# Patient Record
Sex: FEMALE | Race: WHITE | NOT HISPANIC OR LATINO | Employment: FULL TIME | URBAN - NONMETROPOLITAN AREA
[De-identification: names, ages, dates, MRNs, and addresses within clinical notes are randomized per-mention and may not be internally consistent; named-entity substitution may affect disease eponyms.]

---

## 2020-09-18 ENCOUNTER — HOSPITAL ENCOUNTER (OUTPATIENT)
Dept: ULTRASOUND IMAGING | Facility: HOSPITAL | Age: 55
Discharge: HOME | End: 2020-09-18

## 2020-09-18 ENCOUNTER — TRANSCRIBE ORDERS (OUTPATIENT)
Dept: ADMINISTRATIVE | Facility: HOSPITAL | Age: 55
End: 2020-09-18

## 2020-09-18 DIAGNOSIS — R79.89 ELEVATED SERUM CREATININE: Primary | ICD-10-CM

## 2020-09-21 ENCOUNTER — HOSPITAL ENCOUNTER (OUTPATIENT)
Dept: ULTRASOUND IMAGING | Facility: HOSPITAL | Age: 55
Discharge: HOME | End: 2020-09-21
Admitting: NURSE PRACTITIONER

## 2020-09-21 ENCOUNTER — TRANSCRIBE ORDERS (OUTPATIENT)
Dept: ADMINISTRATIVE | Facility: HOSPITAL | Age: 55
End: 2020-09-21

## 2020-09-21 DIAGNOSIS — R79.89 ELEVATED SERUM CREATININE: Primary | ICD-10-CM

## 2020-09-21 PROCEDURE — 76775 US EXAM ABDO BACK WALL LIM: CPT

## 2021-01-20 DIAGNOSIS — D69.6 THROMBOCYTOPENIA (HCC): Primary | ICD-10-CM

## 2021-01-21 ENCOUNTER — OFFICE VISIT (OUTPATIENT)
Dept: HEMATOLOGY | Age: 56
End: 2021-01-21
Payer: COMMERCIAL

## 2021-01-21 ENCOUNTER — HOSPITAL ENCOUNTER (OUTPATIENT)
Dept: INFUSION THERAPY | Age: 56
Discharge: HOME | End: 2021-01-21
Payer: COMMERCIAL

## 2021-01-21 VITALS
DIASTOLIC BLOOD PRESSURE: 83 MMHG | SYSTOLIC BLOOD PRESSURE: 157 MMHG | OXYGEN SATURATION: 97 % | HEIGHT: 63 IN | BODY MASS INDEX: 30.69 KG/M2 | HEART RATE: 73 BPM | TEMPERATURE: 97.3 F | WEIGHT: 173.2 LBS

## 2021-01-21 DIAGNOSIS — D75.89 MACROCYTOSIS: ICD-10-CM

## 2021-01-21 DIAGNOSIS — D69.6 THROMBOCYTOPENIA (HCC): ICD-10-CM

## 2021-01-21 DIAGNOSIS — Z86.2 HISTORY OF ANEMIA: ICD-10-CM

## 2021-01-21 DIAGNOSIS — D69.6 THROMBOCYTOPENIA (HCC): Primary | ICD-10-CM

## 2021-01-21 DIAGNOSIS — N18.9 CHRONIC KIDNEY DISEASE, UNSPECIFIED CKD STAGE: ICD-10-CM

## 2021-01-21 DIAGNOSIS — F10.10 ALCOHOL ABUSE: ICD-10-CM

## 2021-01-21 LAB
BASOPHILS ABSOLUTE: 0.03 K/UL (ref 0.01–0.08)
BASOPHILS RELATIVE PERCENT: 0.3 % (ref 0.1–1.2)
EOSINOPHILS ABSOLUTE: 0.14 K/UL (ref 0.04–0.54)
EOSINOPHILS RELATIVE PERCENT: 1.4 % (ref 0.7–7)
FOLATE: 5.8 NG/ML (ref 2.7–20)
HCT VFR BLD CALC: 38.6 % (ref 34.1–44.9)
HEMOGLOBIN: 13 G/DL (ref 11.2–15.7)
LACTATE DEHYDROGENASE: 479 U/L (ref 313–618)
LYMPHOCYTES ABSOLUTE: 2.07 K/UL (ref 1.18–3.74)
LYMPHOCYTES RELATIVE PERCENT: 20.5 % (ref 19.3–53.1)
MCH RBC QN AUTO: 33.8 PG (ref 25.6–32.2)
MCHC RBC AUTO-ENTMCNC: 33.7 G/DL (ref 32.3–35.5)
MCV RBC AUTO: 100.3 FL (ref 79.4–94.8)
MONOCYTES ABSOLUTE: 0.55 K/UL (ref 0.24–0.82)
MONOCYTES RELATIVE PERCENT: 5.5 % (ref 4.7–12.5)
NEUTROPHILS ABSOLUTE: 7.29 K/UL (ref 1.56–6.13)
NEUTROPHILS RELATIVE PERCENT: 72.3 % (ref 34–71.1)
PDW BLD-RTO: 13.7 % (ref 11.7–14.4)
PLATELET # BLD: 210 K/UL (ref 182–369)
PMV BLD AUTO: 9.6 FL (ref 7.4–10.4)
RBC # BLD: 3.85 M/UL (ref 3.93–5.22)
VITAMIN B-12: 803 PG/ML (ref 239–931)
WBC # BLD: 10.08 K/UL (ref 3.98–10.04)

## 2021-01-21 PROCEDURE — 99203 OFFICE O/P NEW LOW 30 MIN: CPT | Performed by: NURSE PRACTITIONER

## 2021-01-21 PROCEDURE — 85025 COMPLETE CBC W/AUTO DIFF WBC: CPT

## 2021-01-21 PROCEDURE — 82746 ASSAY OF FOLIC ACID SERUM: CPT

## 2021-01-21 PROCEDURE — 83615 LACTATE (LD) (LDH) ENZYME: CPT

## 2021-01-21 PROCEDURE — 82607 VITAMIN B-12: CPT

## 2021-01-21 PROCEDURE — 99202 OFFICE O/P NEW SF 15 MIN: CPT

## 2021-01-21 ASSESSMENT — ENCOUNTER SYMPTOMS
NAUSEA: 0
COUGH: 0
VOMITING: 0
SORE THROAT: 0
WHEEZING: 0
SHORTNESS OF BREATH: 0
ABDOMINAL PAIN: 0
TROUBLE SWALLOWING: 0
DIARRHEA: 0
EYE ITCHING: 0
CONSTIPATION: 0
EYE DISCHARGE: 0

## 2021-01-21 NOTE — PROGRESS NOTES
Carrol Spangler reports a history of LLE DVT/bilateral PE dating to 2010. She states she was treated at [facility] and treated with a short course of warfarin. She states testing for predisposing factors was negative. Additional specific details are unknown. We will try to obtain records. There is currently no serology or radiographic imaging in epic. Past Medical History:   Diagnosis Date    Alcohol abuse     Cataract     L eye    DVT (deep venous thrombosis) (HCC)     LLE    Hypercholesterolemia     Pulmonary emboli (HCC)     Thyroid cyst     Type II diabetes mellitus (HCC)      Past Surgical History:   Procedure Laterality Date    CHOLECYSTECTOMY, LAPAROSCOPIC         Current Outpatient Medications:     amLODIPine-benazepril (LOTREL) 10-40 MG per capsule, Take 1 capsule by mouth daily, Disp: , Rfl:     nebivolol (BYSTOLIC) 10 MG tablet, Take by mouth daily, Disp: , Rfl:     Dulaglutide (TRULICITY) 1.5 SW/4.9ZJ SOPN, Inject 1.5 mg into the skin once a week, Disp: , Rfl:     FLUoxetine (PROZAC) 20 MG capsule, Take 20 mg by mouth 2 times daily 2 capsules in morning and 2 capsules at night, Disp: , Rfl:     diphenhydrAMINE (BENADRYL) 25 MG capsule, Take 25 mg by mouth every 6 hours as needed for Itching, Disp: , Rfl:     b complex vitamins capsule, Take 1 capsule by mouth daily, Disp: , Rfl:     cyclobenzaprine (FLEXERIL) 10 MG tablet, Take 10 mg by mouth 3 times daily, Disp: , Rfl:     diclofenac sodium (VOLTAREN) 1 % GEL, Apply 4 g topically 3 times daily as needed for Pain, Disp: , Rfl:     Omega-3 1000 MG CAPS, Take by mouth 2 times daily, Disp: , Rfl:     fenofibrate (TRICOR) 145 MG tablet, Take 145 mg by mouth daily, Disp: , Rfl:     rosuvastatin (CRESTOR) 10 MG tablet, Take 10 mg by mouth daily, Disp: , Rfl:     mupirocin (BACTROBAN) 2 % ointment, Apply topically 3 times daily Apply topically 3 times daily. , Disp: , Rfl:   omeprazole (PRILOSEC) 40 MG delayed release capsule, Take 40 mg by mouth daily, Disp: , Rfl:     HYDROcodone-acetaminophen (NORCO) 7.5-325 MG per tablet, Take 1 tablet by mouth every 6 hours as needed for Pain., Disp: , Rfl:    Allergies: Allergies   Allergen Reactions    Penicillins Rash     Pt gets welts when taken     Social History     Tobacco Use    Smoking status: Former Smoker     Packs/day: 2.00     Years: 44.00     Pack years: 88.00     Start date:      Quit date:      Years since quittin.0    Smokeless tobacco: Never Used   Substance Use Topics    Alcohol use: Yes     Comment: 2-3 glasses of Jack/Coke per day    Drug use: Not on file     Family History   Problem Relation Age of Onset    Cancer Mother 80        Lung    Cancer Father 79        renal      Health Maintenance   Topic Date Due    Potassium monitoring  1965    Creatinine monitoring  1965    Hepatitis C screen  1965    Lipid screen  10/02/1973    HIV screen  10/02/1978    DTaP/Tdap/Td vaccine (1 - Tdap) 10/02/1982    Cervical cancer screen  10/02/1984    Diabetes screen  10/02/2003    Breast cancer screen  10/02/2013    Shingles Vaccine (1 of 2) 10/02/2013    Colon cancer screen colonoscopy  10/02/2013    Flu vaccine (1) 2020    Hepatitis A vaccine  Aged Out    Hepatitis B vaccine  Aged Out    Hib vaccine  Aged Out    Meningococcal (ACWY) vaccine  Aged Out    Pneumococcal 0-64 years Vaccine  Aged Out     Subjective   Review of Systems   Constitutional: Positive for fatigue. Negative for fever. HENT: Negative for dental problem, hearing loss, mouth sores, nosebleeds, sore throat and trouble swallowing. Eyes: Negative for discharge and itching. Respiratory: Negative for cough, shortness of breath and wheezing. Cardiovascular: Negative for chest pain, palpitations and leg swelling. Gastrointestinal: Negative for abdominal pain, constipation, diarrhea, nausea and vomiting. Endocrine: Negative for cold intolerance and heat intolerance. Genitourinary: Negative for dysuria, frequency, hematuria and urgency. Musculoskeletal: Negative for arthralgias, joint swelling and myalgias. Skin: Negative for pallor and rash. Allergic/Immunologic: Negative for environmental allergies and immunocompromised state. Neurological: Negative for seizures, syncope and numbness. Hematological: Negative for adenopathy. Does not bruise/bleed easily. Psychiatric/Behavioral: Negative for agitation, behavioral problems and confusion. Objective      Physical Exam  Vitals signs reviewed. Constitutional:       General: She is not in acute distress. Appearance: She is well-developed. She is not toxic-appearing or diaphoretic. HENT:      Head: Normocephalic and atraumatic. Right Ear: External ear normal.      Left Ear: External ear normal.      Nose: Nose normal.      Mouth/Throat:      Mouth: Mucous membranes are moist.   Eyes:      General: No scleral icterus. Right eye: No discharge. Left eye: No discharge. Conjunctiva/sclera: Conjunctivae normal.   Neck:      Musculoskeletal: Neck supple. Trachea: No tracheal deviation. Cardiovascular:      Rate and Rhythm: Normal rate and regular rhythm. Pulmonary:      Effort: Pulmonary effort is normal. No respiratory distress. Breath sounds: Normal breath sounds. No wheezing or rales. Abdominal:      General: Bowel sounds are normal. There is no distension. Palpations: Abdomen is soft. Tenderness: There is no abdominal tenderness. There is no guarding. Genitourinary:     Comments: Exam deferred  Musculoskeletal:         General: No tenderness or deformity. Comments: Normal ROM all four extremities   Lymphadenopathy:      Cervical: No cervical adenopathy. Right cervical: No superficial or deep cervical adenopathy. Left cervical: No superficial or deep cervical adenopathy. Upper Body:      Right upper body: No supraclavicular or axillary adenopathy. Left upper body: No supraclavicular or axillary adenopathy. Comments:      Skin:     General: Skin is warm and dry. Findings: No rash. Neurological:      Mental Status: She is alert and oriented to person, place, and time. Comments: follows commands, non-focal   Psychiatric:         Behavior: Behavior normal. Behavior is cooperative. Thought Content: Thought content normal.         Judgment: Judgment normal.      Comments: Alert and oriented to person, place and time. BP (!) 157/83   Pulse 73   Temp 97.3 °F (36.3 °C) (Temporal)   Ht 5' 3\" (1.6 m)   Wt 173 lb 3.2 oz (78.6 kg)   SpO2 97%   BMI 30.68 kg/m²   Wt Readings from Last 3 Encounters:   01/21/21 173 lb 3.2 oz (78.6 kg)     Labs reviewed by me:  CBC 01/21/21: WBC 10.08, Hgb 13.0/.3, platelets 438,701    ASSESSMENT/PLAN:    Kvng Dodd was seen today for new patient. Diagnoses and all orders for this visit:    Thrombocytopenia ([facility] [address]  Platelets 549,269 on 9/17/2020  Platelets 005,476 today, 1/21/2021  -     Lactate Dehydrogenase; Future  -     Vitamin B12; Future  -     Folate; Future  -     Copper, Serum; Future  -     Haptoglobin; Future  -     Reticulocytes; Future    Transient marginal platelet count differential includes alcohol use, possible splenomegaly, vitamin deficiency versus others    Macrocytosis  Hgb 13. .30,   -     Vitamin B12; Future  -     Folate; Future  -     Copper, Serum; Future  -     Haptoglobin; Future  -     Reticulocytes;  Future    History of anemia   Hgb 10.4 in September,2020  Hgb currently 13.0 today, 1/21/2021  could be multifactorial including history of iron deficiency, CKD associated, EtOH, etc.    Chronic kidney disease, unspecified CKD stage

## 2021-01-25 ENCOUNTER — TELEPHONE (OUTPATIENT)
Dept: HEMATOLOGY | Age: 56
End: 2021-01-25

## 2022-10-19 ENCOUNTER — APPOINTMENT (OUTPATIENT)
Dept: CT IMAGING | Age: 57
End: 2022-10-19
Payer: COMMERCIAL

## 2022-10-19 ENCOUNTER — HOSPITAL ENCOUNTER (EMERGENCY)
Age: 57
Discharge: HOME | End: 2022-10-20
Attending: EMERGENCY MEDICINE
Payer: COMMERCIAL

## 2022-10-19 ENCOUNTER — APPOINTMENT (OUTPATIENT)
Dept: GENERAL RADIOLOGY | Age: 57
End: 2022-10-19
Payer: COMMERCIAL

## 2022-10-19 DIAGNOSIS — E16.2 HYPOGLYCEMIA: ICD-10-CM
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
## 2022-10-19 DIAGNOSIS — R55 SYNCOPE, UNSPECIFIED SYNCOPE TYPE: Primary | ICD-10-CM

## 2022-10-19 DIAGNOSIS — S00.83XA TRAUMATIC HEMATOMA OF FOREHEAD, INITIAL ENCOUNTER: ICD-10-CM

## 2022-10-19 DIAGNOSIS — S09.90XA CLOSED HEAD INJURY, INITIAL ENCOUNTER: ICD-10-CM

## 2022-10-19 LAB
ALBUMIN SERPL-MCNC: 4 G/DL (ref 3.5–5.2)
ALP BLD-CCNC: 292 U/L (ref 35–104)
ALT SERPL-CCNC: 36 U/L (ref 5–33)
ANION GAP SERPL CALCULATED.3IONS-SCNC: 16 MMOL/L (ref 7–19)
AST SERPL-CCNC: 47 U/L (ref 5–32)
BASOPHILS ABSOLUTE: 0 K/UL (ref 0–0.2)
BASOPHILS RELATIVE PERCENT: 0.1 % (ref 0–1)
BILIRUB SERPL-MCNC: 0.3 MG/DL (ref 0.2–1.2)
BUN BLDV-MCNC: 22 MG/DL (ref 6–20)
CALCIUM SERPL-MCNC: 9.4 MG/DL (ref 8.6–10)
CHLORIDE BLD-SCNC: 105 MMOL/L (ref 98–111)
CO2: 15 MMOL/L (ref 22–29)
CREAT SERPL-MCNC: 2.1 MG/DL (ref 0.5–0.9)
EOSINOPHILS ABSOLUTE: 0 K/UL (ref 0–0.6)
EOSINOPHILS RELATIVE PERCENT: 0.1 % (ref 0–5)
GFR SERPL CREATININE-BSD FRML MDRD: 27 ML/MIN/{1.73_M2}
GLUCOSE BLD-MCNC: 70 MG/DL (ref 70–99)
GLUCOSE BLD-MCNC: 71 MG/DL (ref 74–109)
GLUCOSE BLD-MCNC: 93 MG/DL (ref 70–99)
HCT VFR BLD CALC: 39.7 % (ref 37–47)
HEMOGLOBIN: 12.8 G/DL (ref 12–16)
IMMATURE GRANULOCYTES #: 0 K/UL
LYMPHOCYTES ABSOLUTE: 0.7 K/UL (ref 1.1–4.5)
LYMPHOCYTES RELATIVE PERCENT: 8.1 % (ref 20–40)
MCH RBC QN AUTO: 30.7 PG (ref 27–31)
MCHC RBC AUTO-ENTMCNC: 32.2 G/DL (ref 33–37)
MCV RBC AUTO: 95.2 FL (ref 81–99)
MONOCYTES ABSOLUTE: 0.4 K/UL (ref 0–0.9)
MONOCYTES RELATIVE PERCENT: 4.4 % (ref 0–10)
NEUTROPHILS ABSOLUTE: 7 K/UL (ref 1.5–7.5)
NEUTROPHILS RELATIVE PERCENT: 87.1 % (ref 50–65)
PDW BLD-RTO: 14 % (ref 11.5–14.5)
PERFORMED ON: NORMAL
PERFORMED ON: NORMAL
PLATELET # BLD: 140 K/UL (ref 130–400)
PMV BLD AUTO: 10 FL (ref 9.4–12.3)
POTASSIUM SERPL-SCNC: 3.7 MMOL/L (ref 3.5–5)
RBC # BLD: 4.17 M/UL (ref 4.2–5.4)
SODIUM BLD-SCNC: 136 MMOL/L (ref 136–145)
TOTAL PROTEIN: 7.3 G/DL (ref 6.6–8.7)
WBC # BLD: 8 K/UL (ref 4.8–10.8)

## 2022-10-19 PROCEDURE — 99285 EMERGENCY DEPT VISIT HI MDM: CPT

## 2022-10-19 PROCEDURE — 93005 ELECTROCARDIOGRAM TRACING: CPT | Performed by: EMERGENCY MEDICINE

## 2022-10-19 PROCEDURE — 82947 ASSAY GLUCOSE BLOOD QUANT: CPT

## 2022-10-19 PROCEDURE — 72125 CT NECK SPINE W/O DYE: CPT

## 2022-10-19 PROCEDURE — 72125 CT NECK SPINE W/O DYE: CPT | Performed by: RADIOLOGY

## 2022-10-19 PROCEDURE — 36415 COLL VENOUS BLD VENIPUNCTURE: CPT

## 2022-10-19 PROCEDURE — 70450 CT HEAD/BRAIN W/O DYE: CPT | Performed by: RADIOLOGY

## 2022-10-19 PROCEDURE — 70450 CT HEAD/BRAIN W/O DYE: CPT

## 2022-10-19 PROCEDURE — 80053 COMPREHEN METABOLIC PANEL: CPT

## 2022-10-19 PROCEDURE — 85025 COMPLETE CBC W/AUTO DIFF WBC: CPT

## 2022-10-19 PROCEDURE — 71045 X-RAY EXAM CHEST 1 VIEW: CPT

## 2022-10-19 PROCEDURE — 71045 X-RAY EXAM CHEST 1 VIEW: CPT | Performed by: RADIOLOGY

## 2022-10-19 ASSESSMENT — ENCOUNTER SYMPTOMS
RHINORRHEA: 0
VOICE CHANGE: 0
EYE PAIN: 0
DIARRHEA: 0
EYE REDNESS: 0
VOMITING: 0
COUGH: 0
ABDOMINAL PAIN: 0
SHORTNESS OF BREATH: 0

## 2022-10-19 ASSESSMENT — PAIN - FUNCTIONAL ASSESSMENT: PAIN_FUNCTIONAL_ASSESSMENT: 0-10

## 2022-10-19 ASSESSMENT — PAIN SCALES - GENERAL: PAINLEVEL_OUTOF10: 0

## 2022-10-20 VITALS
SYSTOLIC BLOOD PRESSURE: 142 MMHG | TEMPERATURE: 98.4 F | OXYGEN SATURATION: 95 % | HEART RATE: 86 BPM | DIASTOLIC BLOOD PRESSURE: 73 MMHG | HEIGHT: 63 IN | RESPIRATION RATE: 13 BRPM | BODY MASS INDEX: 33.66 KG/M2 | WEIGHT: 190 LBS

## 2022-10-20 LAB
EKG P AXIS: 46 DEGREES
EKG P-R INTERVAL: 186 MS
EKG Q-T INTERVAL: 376 MS
EKG QRS DURATION: 102 MS
EKG QTC CALCULATION (BAZETT): 407 MS
EKG T AXIS: 52 DEGREES

## 2022-10-20 NOTE — ED NOTES
Discharge teaching provided, pt verbalized understanding and waiting in her room for a ride.       yKlie Hernández RN  10/20/22 0001

## 2022-10-20 NOTE — ED NOTES
PT assisted to POV via w/c with sister driving without incident.       Jamir Carmen RN  10/20/22 0005

## 2022-10-20 NOTE — ED PROVIDER NOTES
[facility] EMERGENCY DEPT  EMERGENCY DEPARTMENT ENCOUNTER      Pt Name: Isauro Faria  MRN: [id number]  Calliegfpallavi 1965  Date of evaluation: 10/19/2022  Provider: Belia Olson MD    CHIEF COMPLAINT       Chief Complaint   Patient presents with    Loss of Consciousness     \"Felt weird\", fell, hit her head and passed out. Cap glucose 75, oral glucose given by first responders         HISTORY OF PRESENT ILLNESS   (Location/Symptom, Timing/Onset,Context/Setting, Quality, Duration, Modifying Factors, Severity)  Note limiting factors. Isauro Faria is a 59 y.o. female who presents to the emergency department for evaluation after an episode of loss of consciousness tonight. Patient says that she went to work this evening and after she got there started feeling lightheaded and dizzy. Says she went to take a break and get a soda because her blood sugar was dropping. Says that she got a soda and sat on a stool and felt like there were lights across her vision in both eyes that she could not see through and then she started getting lightheaded and eventually passed out. She then fell off of the stool she was sitting on on the ground and hit the right side of her head on the ground. Patient says that she was aware but not awake during this episode and she could feel her back tensed up and was trying to keep her self awake during this time. Bystanders said it appeared like she had a seizure for a few seconds. On EMS arrival, blood glucose was low. She was given oral glucose. Mental status improved. Patient says that she had taken her normal medications including diabetes medications today but that she had not eaten. Says normally she eats after she gets to work but did not today. Has a Dexcom glucose monitor. Says that the lowest it got was around 70 prior to this episode and she thought it would be so if she was drinking. Otherwise denies any recent symptoms or illnesses.   Has been otherwise eating and drinking normally. Has history of chronic kidney disease and is followed by nephrology. Does not believe she has had any labs done since August of this year. HPI    NursingNotes were reviewed. REVIEW OF SYSTEMS    (2-9 systems for level 4, 10 or more for level 5)     Review of Systems   Constitutional:  Negative for fatigue and fever. HENT:  Negative for congestion, rhinorrhea and voice change. Eyes:  Negative for pain and redness. Respiratory:  Negative for cough and shortness of breath. Cardiovascular:  Negative for chest pain. Gastrointestinal:  Negative for abdominal pain, diarrhea and vomiting. Endocrine: Negative. Genitourinary: Negative. Musculoskeletal:  Negative for arthralgias and gait problem. Skin:  Negative for rash and wound. Neurological:  Positive for dizziness, syncope and light-headedness. Negative for weakness and headaches. Hematological: Negative. Psychiatric/Behavioral: Negative. All other systems reviewed and are negative. A complete review of systems was performed and is negative except as noted above in the HPI.        PAST MEDICAL HISTORY     Past Medical History:   Diagnosis Date    Alcohol abuse     Cataract     L eye    DVT (deep venous thrombosis) (HCC)     LLE    Hypercholesterolemia     Pulmonary emboli (HCC)     Thyroid cyst     Type II diabetes mellitus (HCC)          SURGICAL HISTORY       Past Surgical History:   Procedure Laterality Date    CHOLECYSTECTOMY, LAPAROSCOPIC           CURRENT MEDICATIONS       Previous Medications    AMLODIPINE-BENAZEPRIL (LOTREL) 10-40 MG PER CAPSULE    Take 1 capsule by mouth daily    B COMPLEX VITAMINS CAPSULE    Take 1 capsule by mouth daily    CYCLOBENZAPRINE (FLEXERIL) 10 MG TABLET    Take 10 mg by mouth 3 times daily    DICLOFENAC SODIUM (VOLTAREN) 1 % GEL    Apply 4 g topically 3 times daily as needed for Pain    DIPHENHYDRAMINE (BENADRYL) 25 MG CAPSULE    Take 25 mg by mouth every 6 hours as needed for Itching    DULAGLUTIDE (TRULICITY) 1.5 KT/4.6CY SOPN    Inject 1.5 mg into the skin once a week    FENOFIBRATE (TRICOR) 145 MG TABLET    Take 145 mg by mouth daily    FLUOXETINE (PROZAC) 20 MG CAPSULE    Take 40 mg by mouth daily In am    HYDROCODONE-ACETAMINOPHEN (NORCO) 7.5-325 MG PER TABLET    Take 1 tablet by mouth every 6 hours as needed for Pain. INSULIN GLARGINE, 1 UNIT DIAL, (TOUJEO) 300 UNIT/ML CONCENTRATED INJECTION PEN    Inject 56 Units into the skin nightly    MUPIROCIN (BACTROBAN) 2 % OINTMENT    Apply topically 3 times daily Apply topically 3 times daily. NEBIVOLOL (BYSTOLIC) 10 MG TABLET    Take by mouth daily    OMEGA-3 1000 MG CAPS    Take by mouth 2 times daily    OMEPRAZOLE (PRILOSEC) 40 MG DELAYED RELEASE CAPSULE    Take 40 mg by mouth daily    ROSUVASTATIN (CRESTOR) 10 MG TABLET    Take 10 mg by mouth daily       ALLERGIES     Penicillins    FAMILY HISTORY       Family History   Problem Relation Age of Onset    Cancer Mother 80        Lung    Cancer Father 79        renal           SOCIAL HISTORY       Social History     Socioeconomic History    Marital status:      Spouse name: None    Number of children: None    Years of education: None    Highest education level: None   Tobacco Use    Smoking status: Former     Packs/day: 2.00     Years: 44.00     Pack years: 88.00     Types: Cigarettes     Start date: 0     Quit date:      Years since quittin.8    Smokeless tobacco: Never   Vaping Use    Vaping Use: Never used   Substance and Sexual Activity    Alcohol use: Yes     Comment: 2-3 glasses of Jack/Coke per day    Drug use: Never    Sexual activity: Never       SCREENINGS   NIH Stroke Scale  Interval: Baseline  Level of Consciousness (1a): Alert  LOC Questions (1b): Answers both correctly  LOC Commands (1c): Performs both tasks correctly  Best Gaze (2): Normal  Visual (3): No visual loss  Facial Palsy (4): Normal symmetrical movement  Motor Arm, Left (5a):  No drift  Motor Arm, Right (5b): No drift  Motor Leg, Left (6a): No drift  Motor Leg, Right (6b): No drift  Limb Ataxia (7): Absent  Sensory (8): Normal  Best Language (9): No aphasia  Dysarthria (10): Normal  Extinction and Inattention (11): No abnormality  Total: 0Glasgow Coma Scale  Eye Opening: Spontaneous  Best Verbal Response: Oriented  Best Motor Response: Obeys commands  Kassandra Coma Scale Score: 15        PHYSICAL EXAM    (up to 7 for level 4, 8 or more for level 5)     ED Triage Vitals [10/19/22 1859]   BP Temp Temp src Heart Rate Resp SpO2 Height Weight   124/64 98.4 °F (36.9 °C) -- 87 18 99 % 5' 3\" (1.6 m) 190 lb (86.2 kg)       Physical Exam  Vitals and nursing note reviewed. Constitutional:       General: She is not in acute distress. Appearance: She is well-developed. She is not diaphoretic. HENT:      Head: Normocephalic. Contusion present. Comments: Hematoma of the right forehead  Eyes:      General: No scleral icterus. Neck:      Vascular: No JVD. Cardiovascular:      Rate and Rhythm: Normal rate and regular rhythm. Pulses:           Radial pulses are 2+ on the right side and 2+ on the left side. Dorsalis pedis pulses are 2+ on the right side and 2+ on the left side. Heart sounds: Normal heart sounds. No murmur heard. No friction rub. No gallop. Pulmonary:      Effort: Pulmonary effort is normal. No accessory muscle usage or respiratory distress. Breath sounds: Normal breath sounds. No stridor. No decreased breath sounds, wheezing, rhonchi or rales. Chest:      Chest wall: No tenderness. Abdominal:      General: There is no distension. Palpations: Abdomen is soft. Tenderness: There is no abdominal tenderness. There is no guarding or rebound. Musculoskeletal:         General: No deformity. Normal range of motion. Right lower leg: No edema. Left lower leg: No edema. Skin:     General: Skin is warm and dry.       Coloration: Skin is not pale. Findings: No erythema. Neurological:      Mental Status: She is alert and oriented to person, place, and time. GCS: GCS eye subscore is 4. GCS verbal subscore is 5. GCS motor subscore is 6. Cranial Nerves: No cranial nerve deficit. Motor: No abnormal muscle tone. Coordination: Coordination normal.   Psychiatric:         Behavior: Behavior normal.         Judgment: Judgment normal.       DIAGNOSTIC RESULTS     EKG: All EKG's are interpreted by the Emergency Department Physician who either signs or Co-signs this chart in the absence of a cardiologist.        RADIOLOGY:   Non-plain film images such as CT, Ultrasound and MRI are read by the radiologist. Terri Padronsher images are visualized and preliminarily interpreted by the emergency physician with the below findings:        Interpretation per the Radiologist below, if available at the time of this note:    CT HEAD WO CONTRAST   Final Result   No acute intracranial abnormality is present. No evidence of acute cortical infarction, hemorrhage, mass or mass effect   Recommendation: Follow up as clinically indicated. All CT scans at this facility utilize dose modulation, iterative reconstruction, and/or weight based dosing when appropriate to reduce radiation dose to as low as reasonably achievable. Electronically Signed by Christopher Cruz MD at 19-Oct-2022 09:45:01 PM EST               CT CERVICAL SPINE WO CONTRAST   Final Result       1. Mild straightening of cervical spine. 2. Mild degenerative changes at C1-C2. 3. Anterior marginal osteophytes from C4-5 to C6-7 with reduced C5-6 and C6-7 intervertebral disc space. 4.  No acute fracture or dislocation. Recommendation: Follow up as clinically indicated. Electronically Signed by Christopher Cruz MD at 19-Oct-2022 11:07:49 PM EST               XR CHEST PORTABLE   Final Result   No gross infiltrate   Recommendation: Follow up as clinically indicated. Electronically Signed by Luis Quinn MD at 19-Oct-2022 09:36:49 PM EST                     ED BEDSIDE ULTRASOUND:   Performed by ED Physician - none    LABS:  Labs Reviewed   COMPREHENSIVE METABOLIC PANEL - Abnormal; Notable for the following components:       Result Value    CO2 15 (*)     Glucose 71 (*)     BUN 22 (*)     Creatinine 2.1 (*)     Est, Glom Filt Rate 27 (*)     Alkaline Phosphatase 292 (*)     ALT 36 (*)     AST 47 (*)     All other components within normal limits   CBC WITH AUTO DIFFERENTIAL - Abnormal; Notable for the following components:    RBC 4.17 (*)     MCHC 32.2 (*)     Neutrophils % 87.1 (*)     Lymphocytes % 8.1 (*)     Lymphocytes Absolute 0.7 (*)     All other components within normal limits   URINALYSIS   POCT GLUCOSE   POCT GLUCOSE       All other labs were within normal range or not returned as of this dictation. Medications - No data to display    EMERGENCY DEPARTMENT COURSE and DIFFERENTIALDIAGNOSIS/MDM:   Vitals:    Vitals:    10/19/22 1859 10/19/22 2130   BP: 124/64 130/75   Pulse: 87 76   Resp: 18 23   Temp: 98.4 °F (36.9 °C)    SpO2: 99% 93%   Weight: 190 lb (86.2 kg)    Height: 5' 3\" (1.6 m)        MDM    EKG shows normal sinus rhythm with a rate of 79. No findings of acute ischemia or infarction. Normal QRS and QTc intervals. EKG read as concerning for junctional rhythm by computer MRI there are clear P waves associated with QRS complexes. ED Course as of 10/20/22 0001   Wed Oct 19, 2022   2231 Reevaluated at this time. Has been able to eat and drink without difficulty. No nausea or vomiting. Has Dexcom glucose meter and blood glucose readings have been over 100 recently here. Work-up shows no traumatic injury from the fall. Laboratory evaluation shows renal insufficiency. No prior baseline for comparison. Patient does not remember what her recent kidney function has been.   Says that she has missed recent follow-up appointments with her nephrologist. Discussed further management options. Given resolved hypoglycemia, tolerating oral intake, and otherwise reassuring work-up here, patient agreeable with discharge home. Does have Dexcom meter that is functional.  Agrees to return precautions and outpatient follow-up with primary care and nephrology [JARRELL]      ED Course User Index  [JARRELL] Jeniffer Gonzalez MD     Evaluation and work-up here revealed no signs of emergent or life-threatening pathology that would necessitate admission for further work-up or management at this time. Patient is felt to be stable for discharge home with return precautions for worsening of the condition or development of new concerning symptoms. Patient was encouraged to follow-up with their primary care doctor in the appropriate timeframe. Necessary prescriptions and information have been provided for treatment at home. Patient voices understanding and agreement with the plan. CONSULTS:  None    PROCEDURES:  Unless otherwise notedbelow, none     Procedures      FINAL IMPRESSION     1. Syncope, unspecified syncope type    2. Hypoglycemia    3. Fall from stool    4. Closed head injury, initial encounter    5. Traumatic hematoma of forehead, initial encounter          DISPOSITION/PLAN   DISPOSITION Decision To Discharge 10/19/2022 10:19:44 PM      No notes of EC Admission Criteria type on file.     PATIENT REFERRED TO:  [address] [place] [address] EMERGENCY DEPT  [facility]  [phone]    If symptoms worsen    Natalie Page MD  [address] [place] [address]  [phone]      As needed      DISCHARGE MEDICATIONS:  New Prescriptions    No medications on file          (Please note that portions of this note were completed with a voice recognition program.  Efforts were made to edit the dictations butoccasionally words are mis-transcribed.)    Jeniffer Bolanos MD (electronically signed)  AttendingEmergency Physician          Jeniffer Gonzalez MD  10/20/22 0001

## 2022-10-20 NOTE — ED TRIAGE NOTES
PT arrived via EMS from her workplace where she started \"feeling weird\" and \"Seeing spots and flashing red and blue lights\" causing her to fall from a stool and strike her RT temple. Pt reports LOC after falling for an unknown time. VSS, pt afebrile and denies any pain.

## 2023-03-04 ENCOUNTER — HOSPITAL ENCOUNTER (OUTPATIENT)
Age: 58
Setting detail: OBSERVATION
Discharge: HOME | End: 2023-03-06
Attending: PEDIATRICS | Admitting: FAMILY MEDICINE
Payer: COMMERCIAL

## 2023-03-04 ENCOUNTER — APPOINTMENT (OUTPATIENT)
Dept: GENERAL RADIOLOGY | Age: 58
End: 2023-03-04
Payer: COMMERCIAL

## 2023-03-04 ENCOUNTER — APPOINTMENT (OUTPATIENT)
Dept: CT IMAGING | Age: 58
End: 2023-03-04
Payer: COMMERCIAL

## 2023-03-04 DIAGNOSIS — R56.9 SEIZURE (HCC): Primary | ICD-10-CM

## 2023-03-04 DIAGNOSIS — N18.4 STAGE 4 CHRONIC KIDNEY DISEASE (HCC): ICD-10-CM

## 2023-03-04 LAB
ALBUMIN SERPL-MCNC: 3.6 G/DL (ref 3.5–5.2)
ALP BLD-CCNC: 195 U/L (ref 35–104)
ALT SERPL-CCNC: 19 U/L (ref 5–33)
ANION GAP SERPL CALCULATED.3IONS-SCNC: 15 MMOL/L (ref 7–19)
AST SERPL-CCNC: 31 U/L (ref 5–32)
BASOPHILS ABSOLUTE: 0 K/UL (ref 0–0.2)
BASOPHILS RELATIVE PERCENT: 0.4 % (ref 0–1)
BILIRUB SERPL-MCNC: 0.3 MG/DL (ref 0.2–1.2)
BUN BLDV-MCNC: 45 MG/DL (ref 6–20)
CALCIUM SERPL-MCNC: 8.3 MG/DL (ref 8.6–10)
CHLORIDE BLD-SCNC: 108 MMOL/L (ref 98–111)
CO2: 18 MMOL/L (ref 22–29)
CREAT SERPL-MCNC: 2.9 MG/DL (ref 0.5–0.9)
EOSINOPHILS ABSOLUTE: 0.1 K/UL (ref 0–0.6)
EOSINOPHILS RELATIVE PERCENT: 1.4 % (ref 0–5)
GFR SERPL CREATININE-BSD FRML MDRD: 18 ML/MIN/{1.73_M2}
GLUCOSE BLD-MCNC: 125 MG/DL (ref 74–109)
HCT VFR BLD CALC: 37.7 % (ref 37–47)
HEMOGLOBIN: 12.2 G/DL (ref 12–16)
IMMATURE GRANULOCYTES #: 0 K/UL
LYMPHOCYTES ABSOLUTE: 1.6 K/UL (ref 1.1–4.5)
LYMPHOCYTES RELATIVE PERCENT: 27.9 % (ref 20–40)
MCH RBC QN AUTO: 32.3 PG (ref 27–31)
MCHC RBC AUTO-ENTMCNC: 32.4 G/DL (ref 33–37)
MCV RBC AUTO: 99.7 FL (ref 81–99)
MONOCYTES ABSOLUTE: 0.3 K/UL (ref 0–0.9)
MONOCYTES RELATIVE PERCENT: 5.4 % (ref 0–10)
NEUTROPHILS ABSOLUTE: 3.6 K/UL (ref 1.5–7.5)
NEUTROPHILS RELATIVE PERCENT: 64.9 % (ref 50–65)
PDW BLD-RTO: 13.2 % (ref 11.5–14.5)
PLATELET # BLD: 124 K/UL (ref 130–400)
PMV BLD AUTO: 10.7 FL (ref 9.4–12.3)
POTASSIUM SERPL-SCNC: 4.4 MMOL/L (ref 3.5–5)
RBC # BLD: 3.78 M/UL (ref 4.2–5.4)
SARS-COV-2, NAAT: NOT DETECTED
SODIUM BLD-SCNC: 141 MMOL/L (ref 136–145)
TOTAL PROTEIN: 7 G/DL (ref 6.6–8.7)
TROPONIN: <0.01 NG/ML (ref 0–0.03)
WBC # BLD: 5.6 K/UL (ref 4.8–10.8)

## 2023-03-04 PROCEDURE — 85025 COMPLETE CBC W/AUTO DIFF WBC: CPT

## 2023-03-04 PROCEDURE — 80053 COMPREHEN METABOLIC PANEL: CPT

## 2023-03-04 PROCEDURE — 36415 COLL VENOUS BLD VENIPUNCTURE: CPT

## 2023-03-04 PROCEDURE — 93005 ELECTROCARDIOGRAM TRACING: CPT | Performed by: PEDIATRICS

## 2023-03-04 PROCEDURE — 71045 X-RAY EXAM CHEST 1 VIEW: CPT | Performed by: RADIOLOGY

## 2023-03-04 PROCEDURE — 84484 ASSAY OF TROPONIN QUANT: CPT

## 2023-03-04 PROCEDURE — 70450 CT HEAD/BRAIN W/O DYE: CPT

## 2023-03-04 PROCEDURE — 99285 EMERGENCY DEPT VISIT HI MDM: CPT

## 2023-03-04 PROCEDURE — 70450 CT HEAD/BRAIN W/O DYE: CPT | Performed by: RADIOLOGY

## 2023-03-04 PROCEDURE — 71045 X-RAY EXAM CHEST 1 VIEW: CPT

## 2023-03-04 ASSESSMENT — PAIN - FUNCTIONAL ASSESSMENT: PAIN_FUNCTIONAL_ASSESSMENT: NONE - DENIES PAIN

## 2023-03-05 ENCOUNTER — APPOINTMENT (OUTPATIENT)
Dept: ULTRASOUND IMAGING | Age: 58
End: 2023-03-05
Payer: COMMERCIAL

## 2023-03-05 ENCOUNTER — APPOINTMENT (OUTPATIENT)
Dept: MRI IMAGING | Age: 58
End: 2023-03-05
Payer: COMMERCIAL

## 2023-03-05 PROBLEM — E78.2 MIXED HYPERLIPIDEMIA: Status: ACTIVE | Noted: 2023-03-05

## 2023-03-05 PROBLEM — E11.22 TYPE 2 DIABETES MELLITUS WITH STAGE 3B CHRONIC KIDNEY DISEASE, WITHOUT LONG-TERM CURRENT USE OF INSULIN (HCC): Status: ACTIVE | Noted: 2023-03-05

## 2023-03-05 PROBLEM — R56.9 SEIZURE (HCC): Status: ACTIVE | Noted: 2023-03-05

## 2023-03-05 PROBLEM — N18.32 ACUTE RENAL FAILURE SUPERIMPOSED ON STAGE 3B CHRONIC KIDNEY DISEASE (HCC): Status: ACTIVE | Noted: 2023-03-05

## 2023-03-05 PROBLEM — I10 PRIMARY HYPERTENSION: Status: ACTIVE | Noted: 2023-03-05

## 2023-03-05 PROBLEM — N18.32 TYPE 2 DIABETES MELLITUS WITH STAGE 3B CHRONIC KIDNEY DISEASE, WITHOUT LONG-TERM CURRENT USE OF INSULIN (HCC): Status: ACTIVE | Noted: 2023-03-05

## 2023-03-05 PROBLEM — R56.9 NEW ONSET SEIZURE (HCC): Status: ACTIVE | Noted: 2023-03-05

## 2023-03-05 PROBLEM — N17.9 ACUTE RENAL FAILURE SUPERIMPOSED ON STAGE 3B CHRONIC KIDNEY DISEASE (HCC): Status: ACTIVE | Noted: 2023-03-05

## 2023-03-05 LAB
BACTERIA: NEGATIVE /HPF
BILIRUBIN URINE: NEGATIVE
BLOOD, URINE: ABNORMAL
CLARITY: CLEAR
COLOR: YELLOW
CRYSTALS, UA: ABNORMAL /HPF
EPITHELIAL CELLS, UA: 1 /HPF (ref 0–5)
GLUCOSE BLD-MCNC: 113 MG/DL (ref 70–99)
GLUCOSE BLD-MCNC: 138 MG/DL (ref 70–99)
GLUCOSE BLD-MCNC: 218 MG/DL (ref 70–99)
GLUCOSE BLD-MCNC: 87 MG/DL (ref 70–99)
GLUCOSE URINE: 100 MG/DL
HYALINE CASTS: 3 /HPF (ref 0–8)
KETONES, URINE: NEGATIVE MG/DL
LEUKOCYTE ESTERASE, URINE: NEGATIVE
NITRITE, URINE: NEGATIVE
PERFORMED ON: ABNORMAL
PERFORMED ON: NORMAL
PH UA: 5.5 (ref 5–8)
PROTEIN UA: 300 MG/DL
RBC UA: 1 /HPF (ref 0–4)
SPECIFIC GRAVITY UA: 1.02 (ref 1–1.03)
UROBILINOGEN, URINE: 0.2 E.U./DL
WBC UA: 5 /HPF (ref 0–5)

## 2023-03-05 PROCEDURE — 6370000000 HC RX 637 (ALT 250 FOR IP): Performed by: FAMILY MEDICINE

## 2023-03-05 PROCEDURE — G0378 HOSPITAL OBSERVATION PER HR: HCPCS

## 2023-03-05 PROCEDURE — 96372 THER/PROPH/DIAG INJ SC/IM: CPT

## 2023-03-05 PROCEDURE — 87635 SARS-COV-2 COVID-19 AMP PRB: CPT

## 2023-03-05 PROCEDURE — 6360000002 HC RX W HCPCS: Performed by: FAMILY MEDICINE

## 2023-03-05 PROCEDURE — 94760 N-INVAS EAR/PLS OXIMETRY 1: CPT

## 2023-03-05 PROCEDURE — 2580000003 HC RX 258: Performed by: FAMILY MEDICINE

## 2023-03-05 PROCEDURE — 70551 MRI BRAIN STEM W/O DYE: CPT | Performed by: RADIOLOGY

## 2023-03-05 PROCEDURE — 99223 1ST HOSP IP/OBS HIGH 75: CPT | Performed by: PSYCHIATRY & NEUROLOGY

## 2023-03-05 PROCEDURE — 76770 US EXAM ABDO BACK WALL COMP: CPT

## 2023-03-05 PROCEDURE — 82962 GLUCOSE BLOOD TEST: CPT

## 2023-03-05 PROCEDURE — 96374 THER/PROPH/DIAG INJ IV PUSH: CPT

## 2023-03-05 PROCEDURE — 6370000000 HC RX 637 (ALT 250 FOR IP): Performed by: INTERNAL MEDICINE

## 2023-03-05 PROCEDURE — 95819 EEG AWAKE AND ASLEEP: CPT | Performed by: PSYCHIATRY & NEUROLOGY

## 2023-03-05 PROCEDURE — 81001 URINALYSIS AUTO W/SCOPE: CPT

## 2023-03-05 PROCEDURE — 76770 US EXAM ABDO BACK WALL COMP: CPT | Performed by: RADIOLOGY

## 2023-03-05 PROCEDURE — 6360000002 HC RX W HCPCS: Performed by: PSYCHIATRY & NEUROLOGY

## 2023-03-05 PROCEDURE — 95816 EEG AWAKE AND DROWSY: CPT

## 2023-03-05 PROCEDURE — 70551 MRI BRAIN STEM W/O DYE: CPT

## 2023-03-05 RX ORDER — HYDROCODONE BITARTRATE AND ACETAMINOPHEN 7.5; 325 MG/1; MG/1
1 TABLET ORAL EVERY 6 HOURS PRN
Status: DISCONTINUED | OUTPATIENT
Start: 2023-03-05 | End: 2023-03-06 | Stop reason: HOSPADM

## 2023-03-05 RX ORDER — PANTOPRAZOLE SODIUM 40 MG/1
40 TABLET, DELAYED RELEASE ORAL
Status: DISCONTINUED | OUTPATIENT
Start: 2023-03-05 | End: 2023-03-06 | Stop reason: HOSPADM

## 2023-03-05 RX ORDER — SODIUM CHLORIDE 9 MG/ML
INJECTION, SOLUTION INTRAVENOUS PRN
Status: DISCONTINUED | OUTPATIENT
Start: 2023-03-05 | End: 2023-03-06 | Stop reason: HOSPADM

## 2023-03-05 RX ORDER — LORAZEPAM 2 MG/ML
2 INJECTION INTRAMUSCULAR ONCE
Status: COMPLETED | OUTPATIENT
Start: 2023-03-05 | End: 2023-03-05

## 2023-03-05 RX ORDER — ONDANSETRON 4 MG/1
4 TABLET, ORALLY DISINTEGRATING ORAL EVERY 8 HOURS PRN
Status: DISCONTINUED | OUTPATIENT
Start: 2023-03-05 | End: 2023-03-06 | Stop reason: HOSPADM

## 2023-03-05 RX ORDER — ENOXAPARIN SODIUM 100 MG/ML
30 INJECTION SUBCUTANEOUS DAILY
Status: DISCONTINUED | OUTPATIENT
Start: 2023-03-05 | End: 2023-03-06 | Stop reason: HOSPADM

## 2023-03-05 RX ORDER — SODIUM CHLORIDE 0.9 % (FLUSH) 0.9 %
5-40 SYRINGE (ML) INJECTION EVERY 12 HOURS SCHEDULED
Status: DISCONTINUED | OUTPATIENT
Start: 2023-03-05 | End: 2023-03-06 | Stop reason: HOSPADM

## 2023-03-05 RX ORDER — INSULIN LISPRO 100 [IU]/ML
0-4 INJECTION, SOLUTION INTRAVENOUS; SUBCUTANEOUS NIGHTLY
Status: DISCONTINUED | OUTPATIENT
Start: 2023-03-05 | End: 2023-03-06 | Stop reason: HOSPADM

## 2023-03-05 RX ORDER — LEVETIRACETAM 500 MG/1
500 TABLET ORAL 2 TIMES DAILY
Status: DISCONTINUED | OUTPATIENT
Start: 2023-03-05 | End: 2023-03-06 | Stop reason: HOSPADM

## 2023-03-05 RX ORDER — ROSUVASTATIN CALCIUM 10 MG/1
10 TABLET, COATED ORAL NIGHTLY
Status: DISCONTINUED | OUTPATIENT
Start: 2023-03-05 | End: 2023-03-06 | Stop reason: HOSPADM

## 2023-03-05 RX ORDER — LORAZEPAM 2 MG/ML
1 INJECTION INTRAMUSCULAR EVERY 4 HOURS PRN
Status: DISCONTINUED | OUTPATIENT
Start: 2023-03-05 | End: 2023-03-06 | Stop reason: HOSPADM

## 2023-03-05 RX ORDER — FLUOXETINE HYDROCHLORIDE 20 MG/1
40 CAPSULE ORAL DAILY
Status: DISCONTINUED | OUTPATIENT
Start: 2023-03-05 | End: 2023-03-06 | Stop reason: HOSPADM

## 2023-03-05 RX ORDER — INSULIN LISPRO 100 [IU]/ML
0-4 INJECTION, SOLUTION INTRAVENOUS; SUBCUTANEOUS
Status: DISCONTINUED | OUTPATIENT
Start: 2023-03-05 | End: 2023-03-06 | Stop reason: HOSPADM

## 2023-03-05 RX ORDER — INSULIN GLARGINE 100 [IU]/ML
15 INJECTION, SOLUTION SUBCUTANEOUS NIGHTLY
Status: DISCONTINUED | OUTPATIENT
Start: 2023-03-05 | End: 2023-03-06 | Stop reason: HOSPADM

## 2023-03-05 RX ORDER — ACETAMINOPHEN 325 MG/1
650 TABLET ORAL EVERY 4 HOURS PRN
Status: DISCONTINUED | OUTPATIENT
Start: 2023-03-05 | End: 2023-03-06 | Stop reason: HOSPADM

## 2023-03-05 RX ORDER — ONDANSETRON 2 MG/ML
4 INJECTION INTRAMUSCULAR; INTRAVENOUS EVERY 6 HOURS PRN
Status: DISCONTINUED | OUTPATIENT
Start: 2023-03-05 | End: 2023-03-06 | Stop reason: HOSPADM

## 2023-03-05 RX ORDER — DEXTROSE MONOHYDRATE 100 MG/ML
INJECTION, SOLUTION INTRAVENOUS CONTINUOUS PRN
Status: DISCONTINUED | OUTPATIENT
Start: 2023-03-05 | End: 2023-03-06 | Stop reason: HOSPADM

## 2023-03-05 RX ORDER — SODIUM BICARBONATE 650 MG/1
650 TABLET ORAL 2 TIMES DAILY
Status: DISCONTINUED | OUTPATIENT
Start: 2023-03-05 | End: 2023-03-06 | Stop reason: HOSPADM

## 2023-03-05 RX ORDER — SODIUM CHLORIDE 0.9 % (FLUSH) 0.9 %
5-40 SYRINGE (ML) INJECTION PRN
Status: DISCONTINUED | OUTPATIENT
Start: 2023-03-05 | End: 2023-03-06 | Stop reason: HOSPADM

## 2023-03-05 RX ADMIN — SODIUM BICARBONATE 650 MG: 650 TABLET ORAL at 21:09

## 2023-03-05 RX ADMIN — FLUOXETINE HYDROCHLORIDE 40 MG: 20 CAPSULE ORAL at 09:35

## 2023-03-05 RX ADMIN — ROSUVASTATIN CALCIUM 10 MG: 10 TABLET, FILM COATED ORAL at 21:09

## 2023-03-05 RX ADMIN — HYDROCODONE BITARTRATE AND ACETAMINOPHEN 1 TABLET: 7.5; 325 TABLET ORAL at 21:09

## 2023-03-05 RX ADMIN — SODIUM CHLORIDE, PRESERVATIVE FREE 10 ML: 5 INJECTION INTRAVENOUS at 09:35

## 2023-03-05 RX ADMIN — SODIUM BICARBONATE 650 MG: 650 TABLET ORAL at 14:51

## 2023-03-05 RX ADMIN — SODIUM CHLORIDE, PRESERVATIVE FREE 10 ML: 5 INJECTION INTRAVENOUS at 21:09

## 2023-03-05 RX ADMIN — ENOXAPARIN SODIUM 30 MG: 100 INJECTION SUBCUTANEOUS at 09:33

## 2023-03-05 RX ADMIN — PANTOPRAZOLE SODIUM 40 MG: 40 TABLET, DELAYED RELEASE ORAL at 09:34

## 2023-03-05 RX ADMIN — LORAZEPAM 2 MG: 2 INJECTION INTRAMUSCULAR; INTRAVENOUS at 18:04

## 2023-03-05 RX ADMIN — LEVETIRACETAM 500 MG: 500 TABLET, FILM COATED ORAL at 21:09

## 2023-03-05 ASSESSMENT — ENCOUNTER SYMPTOMS
RHINORRHEA: 0
SHORTNESS OF BREATH: 0
BACK PAIN: 0
NAUSEA: 0
ABDOMINAL PAIN: 0
COLOR CHANGE: 0
COUGH: 0
VOMITING: 0
EYE DISCHARGE: 0
BACK PAIN: 1
PHOTOPHOBIA: 0

## 2023-03-05 ASSESSMENT — PAIN DESCRIPTION - PAIN TYPE: TYPE: CHRONIC PAIN

## 2023-03-05 ASSESSMENT — PAIN DESCRIPTION - LOCATION: LOCATION: BACK

## 2023-03-05 ASSESSMENT — PAIN DESCRIPTION - DESCRIPTORS: DESCRIPTORS: ACHING

## 2023-03-05 ASSESSMENT — PAIN SCALES - GENERAL: PAINLEVEL_OUTOF10: 8

## 2023-03-05 ASSESSMENT — PAIN DESCRIPTION - ORIENTATION: ORIENTATION: LOWER

## 2023-03-05 ASSESSMENT — PAIN DESCRIPTION - FREQUENCY: FREQUENCY: INTERMITTENT

## 2023-03-05 ASSESSMENT — PAIN DESCRIPTION - ONSET: ONSET: ON-GOING

## 2023-03-05 NOTE — PLAN OF CARE
Problem: Discharge Planning  Goal: Discharge to home or other facility with appropriate resources  Recent Flowsheet Documentation  Taken 3/5/2023 0733 by Nelly Ayala RN  Discharge to home or other facility with appropriate resources: Identify barriers to discharge with patient and caregiver  3/5/2023 0543 by Ruddy Lowery LPN  Outcome: Completed  Flowsheets (Taken 3/5/2023 0430)  Discharge to home or other facility with appropriate resources: Identify barriers to discharge with patient and caregiver     Problem: Safety - Adult  Goal: Free from fall injury  3/5/2023 0543 by Ruddy Lowery LPN  Outcome: Completed

## 2023-03-05 NOTE — CONSULTS
Nephrology ([address]) Consult Note      Patient:  Crista Staton  YOB: 1965  Date of Service: 3/5/2023  MRN: [id number]   Acct: [de-identified]   Primary Care Physician: Ana Dickerson MD  Advance Directive: Full Code  Admit Date: 3/4/2023       Hospital Day: 0  Referring Provider: Ana Dickerson MD    Patient independently seen and examined, Chart, Consults, Notes, Operative notes, Labs, Cardiology, and Radiology studies reviewed as available. Subjective:  Crista Staton is a 59 y.o. female for whom we were consulted for evaluation and treatment of acute kidney injury with baseline chronic kidney disease stage IV. Patient unsure of specific baseline GFR but recalled stage IV. She is followed with the [facility] group for CKD but does not believe she has seen them in over a year. Had a reported seizure and was admitted for further evaluation. Given Keppra in the emergency room. When initially seen she denied current complaints including chest pain, shortness of air at rest, nausea or vomiting. Denied dysuria, hematuria, hemoptysis, rash.   Topical NSAIDs noted on home medicine list.    Allergies:  Penicillins    Medicines:  Current Facility-Administered Medications   Medication Dose Route Frequency Provider Last Rate Last Admin    sodium chloride flush 0.9 % injection 5-40 mL  5-40 mL IntraVENous 2 times per day Ana Dickerson MD   10 mL at 03/05/23 0935    sodium chloride flush 0.9 % injection 5-40 mL  5-40 mL IntraVENous PRN Ana Dickerson MD        0.9 % sodium chloride infusion   IntraVENous PRN Ana Dickerson MD        enoxaparin Sodium (LOVENOX) injection 30 mg  30 mg SubCUTAneous Daily Ana Dickerson MD   30 mg at 03/05/23 0933    acetaminophen (TYLENOL) tablet 650 mg  650 mg Oral Q4H PRN Ana Dickerson MD        ondansetron (ZOFRAN-ODT) disintegrating tablet 4 mg  4 mg Oral Q8H PRN Ana Dickerson MD        Or ondansetron (ZOFRAN) injection 4 mg  4 mg IntraVENous Q6H PRN Madonna Garrison MD        levETIRAcetam (KEPPRA) tablet 500 mg  500 mg Oral BID Madonna Garrison MD        LORazepam (ATIVAN) injection 1 mg  1 mg IntraVENous Q4H PRN Madonna Garrison MD        glucose chewable tablet 16 g  4 tablet Oral PRN Erendira Hidalgo MD        dextrose bolus 10% 125 mL  125 mL IntraVENous PRN Erendira Hidalgo MD        Or    dextrose bolus 10% 250 mL  250 mL IntraVENous PRN Erendira Hidalgo MD        glucagon (rDNA) injection 1 mg  1 mg SubCUTAneous PRN Erendira Hidalgo MD        dextrose 10 % infusion   IntraVENous Continuous PRN Erendira Hidalgo MD        FLUoxetine (PROZAC) capsule 40 mg  40 mg Oral Daily Erendira Hidalgo MD   40 mg at 03/05/23 0935    HYDROcodone-acetaminophen ([employer]) 7.5-325 MG per tablet 1 tablet  1 tablet Oral Q6H PRN Erendira Hidalgo MD        pantoprazole (PROTONIX) tablet 40 mg  40 mg Oral QAM AC Erendira Hidalgo MD   40 mg at 03/05/23 0934    rosuvastatin (CRESTOR) tablet 10 mg  10 mg Oral Nightly Erendira Hidalgo MD        insulin lispro (HUMALOG) injection vial 0-4 Units  0-4 Units SubCUTAneous TID WC Erendira Hidalgo MD        insulin lispro (HUMALOG) injection vial 0-4 Units  0-4 Units SubCUTAneous Nightly Erendira Hidalgo MD           Past Medical History:  Past Medical History:   Diagnosis Date    Alcohol abuse     Cataract     L eye    DVT (deep venous thrombosis) (HCC)     LLE    Hypercholesterolemia     Pulmonary emboli (HCC)     Seizures (HCC)     Thyroid cyst     Type II diabetes mellitus ([facility] [address]        Past Surgical History:  Past Surgical History:   Procedure Laterality Date    CHOLECYSTECTOMY, LAPAROSCOPIC         Family History  Family History   Problem Relation Age of Onset    Cancer Mother 80        Lung    Cancer Father 79        renal        Social History  Social History     Socioeconomic History    Marital status:      Spouse name: Not on file    Number of children: Not on file    Years of education: Not on file    Highest education level: Not on file   Occupational History    Not on file   Tobacco Use    Smoking status: Former     Packs/day: 2.00     Years: 44.00     Pack years: 88.00     Types: Cigarettes     Start date: 0     Quit date:      Years since quittin.1    Smokeless tobacco: Never   Vaping Use    Vaping Use: Never used   Substance and Sexual Activity    Alcohol use: Yes     Comment: 2-3 glasses of Manjit/Coke per day    Drug use: Yes     Types: Marijuana (Weed)     Comment: occassional    Sexual activity: Never   Other Topics Concern    Not on file   Social History Narrative    Not on file     Social Determinants of Health     Financial Resource Strain: Not on file   Food Insecurity: Not on file   Transportation Needs: Not on file   Physical Activity: Not on file   Stress: Not on file   Social Connections: Not on file   Intimate Partner Violence: Not on file   Housing Stability: Not on file         Review of Systems:  History obtained from chart review and the patient  General ROS: No fever or chills  Respiratory ROS: No cough, shortness of breath, wheezing  Cardiovascular ROS: No chest pain or palpitations  Gastrointestinal ROS: No abdominal pain or melena  Genito-Urinary ROS: No dysuria or hematuria  Musculoskeletal ROS: No joint pain or swelling   14 point ROS reviewed with the patient and negative except as noted above and in the HPI unless unable to obtain.     Objective:  Patient Vitals for the past 24 hrs:   BP Temp Temp src Pulse Resp SpO2 Height Weight   23 1046 -- -- -- -- -- 98 % -- --   23 0733 133/74 96.9 °F (36.1 °C) Temporal 60 16 94 % -- --   23 0548 118/78 97.5 °F (36.4 °C) Tympanic 65 17 -- -- --   23 0430 131/78 98.1 °F (36.7 °C) Oral 65 -- 95 % -- --   23 0130 (!) 152/86 -- -- 71 17 95 % -- --   23 2037 (!) 144/77 98.3 °F (36.8 °C) -- 92 18 96 % 5' 3\" (1.6 m) 175 lb (79.4 kg)     No intake or output data in the 24 hours ending 03/05/23 1307  General: awake/alert   Chest:  clear to auscultation bilaterally  CVS: regular rate and rhythm  Abdominal: soft, nontender, normal bowel sounds  Extremities: no cyanosis or edema  Skin: warm and dry without rash      Labs:  BMP:   Recent Labs     03/04/23 2049      K 4.4      CO2 18*   BUN 45*   CREATININE 2.9*   CALCIUM 8.3*     CBC:   Recent Labs     03/04/23 2049   WBC 5.6   HGB 12.2   HCT 37.7   MCV 99.7*   *     LIVER PROFILE:   Recent Labs     03/04/23 2049   AST 31   ALT 19   BILITOT 0.3   ALKPHOS 195*     PT/INR: No results for input(s): PROTIME, INR in the last 72 hours. APTT: No results for input(s): APTT in the last 72 hours. BNP:  No results for input(s): BNP in the last 72 hours. Ionized Calcium:No results for input(s): IONCA in the last 72 hours. Magnesium:No results for input(s): MG in the last 72 hours. Phosphorus:No results for input(s): PHOS in the last 72 hours. HgbA1C: No results for input(s): LABA1C in the last 72 hours. Hepatic:   Recent Labs     03/04/23 2049   ALKPHOS 195*   ALT 19   AST 31   PROT 7.0   BILITOT 0.3   LABALBU 3.6     Lactic Acid: No results for input(s): LACTA in the last 72 hours. Troponin: No results for input(s): CKTOTAL, CKMB, TROPONINT in the last 72 hours. ABGs: No results for input(s): PH, PCO2, PO2, HCO3, O2SAT in the last 72 hours. CRP:  No results for input(s): CRP in the last 72 hours. Sed Rate:  No results for input(s): SEDRATE in the last 72 hours. Cultures:   No results for input(s): CULTURE in the last 72 hours. No results for input(s): BC, Donold Mooring in the last 72 hours. No results for input(s): CXSURG in the last 72 hours.     Radiology reports as per the Radiologist  Radiology: CT Head W/O Contrast    Result Date: 3/4/2023  Patient: [place] Gist  Time Out: 23:25Exam(s): CT HEAD Without Contrast EXAM:  CT Head Without Intravenous ContrastCLINICAL HISTORY:   Reason for exam: Maci Mera ?Seizure. TECHNIQUE:  Axial computed tomography images of the head/brain without intravenous contrast.COMPARISON:  No relevant prior studies available. FINDINGS:  Brain:  Unremarkable. No hemorrhage. No significant white matter disease. No edema. Ventricles:  Unremarkable. No ventriculomegaly. Bones/joints:  Unremarkable. No acute fracture. Soft tissues:  Unremarkable. Sinuses:  Unremarkable as visualized. No acute sinusitis. Mastoid air cells:  Unremarkable as visualized. No mastoid effusion. Normal head/brain CT. Electronically signed by Jany Joe MD on 03-04-23 at 2320    XR CHEST PORTABLE    Result Date: 3/4/2023  Patient: Thomas Lamas  Time Out: 23:24Exam(s): FILM CXR 1 VIEW EXAM:  XR Chest, 1 ViewCLINICAL HISTORY:   Reason for exam: syncope. TECHNIQUE:  Frontal view of the chest.COMPARISON:  No relevant prior studies available. FINDINGS:  Lungs:  Unremarkable. No consolidation. Pleural space:  Unremarkable. No pneumothorax. Heart: Cardiomegaly. Mediastinum:  Unremarkable. Bones/joints:  Unremarkable. Normal chest x-ray. Electronically signed by Jany Joe MD on 03-04-23 at 2325       Assessment   Acute kidney injury  Chronic kidney disease stage IV  Seizure  Hypertension  Metabolic acidosis    Plan:  Discussed with patient, nursing  Work-up ordered ongoing monitor labs  Bicarbonate supplementation  Restart blood pressure medications as needed  Neurology evaluation pending  Reassess in the morning  Follow-up with primary nephrology group 1 week postdischarge, happy to see in our office if desired      Thank you for the consult, we appreciate the opportunity to provide care to your patients. Feel free to contact me if I can be of any further assistance.       Richard Santiago MD  03/05/23  1:07 PM

## 2023-03-05 NOTE — ED PROVIDER NOTES
[facility]  eMERGENCY dEPARTMENT eNCOUnter      Pt Name: Adelia Ramsey  MRN: [id number]  [place] 1965  Date of evaluation: 3/4/2023  Provider: Ponce Olson MD    CHIEF COMPLAINT       Chief Complaint   Patient presents with    Seizures     Had seizure at work, pt able to walk to bed, pt does not take any seizure medications          HISTORY OF PRESENT ILLNESS   (Location/Symptom, Timing/Onset,Context/Setting, Quality, Duration, Modifying Factors, Severity)  Note limiting factors. Adelia Ramsey is a 59 y.o. female who presents to the emergency department with seizure. Patient is unable to give full history. Patient's relative and coworker as well as the second coworker gives majority of history. Patient was at work at [employer] and [employer]. \"  Patient notes that earlier in the day she had an episode of confusion and was having difficulty performing her job. Patient had just taken a break when, according to a coworker, patient began having seizure activity that lasted for approximately 7 minutes. It was described as tonic-clonic. Patient had difficulty breathing and skin turned blue. Patient has had 1 prior episode in October 2022. Patient has not followed up with neurology. Patient has a history of \"convulsions as a child but I outgrew them. \"  Patient states that episode in October was ascribed to hypoglycemia. Patient states that \"I saw sparkly lights. I could not see through the lights to find my EpiPen. I lowered myself to the floor so I would not fall like the last time. I could hear people around me saying Caryn Hernandez is having a seizure. Then I could not hear anything. \"  Patient states that she awoke \"laying on the floor. \"  Patient had confusion after episode. Patient was able to speak to ambulance personnel. Patient reports over time of yesterday and ended up losing sleep. Patient is on no seizure medications.   Patient's primary care provider is  Dorisbo. \[facility]\""    NursingNotes were reviewed. REVIEW OF SYSTEMS    (2-9 systems for level 4, 10 or more for level 5)     Review of Systems   Constitutional:  Negative for chills and fever. HENT:  Negative for congestion and rhinorrhea. Eyes:  Negative for discharge. Respiratory:  Negative for cough and shortness of breath. Cardiovascular:  Negative for chest pain and palpitations. Gastrointestinal:  Negative for abdominal pain, nausea and vomiting. Genitourinary:  Negative for difficulty urinating and dysuria. Musculoskeletal:  Positive for back pain. Negative for neck pain. Skin:  Negative for color change and pallor. Neurological:  Positive for seizures. Negative for syncope, light-headedness and headaches. Psychiatric/Behavioral:  Positive for confusion. Negative for agitation, decreased concentration and suicidal ideas. All other systems reviewed and are negative.          PAST MEDICALHISTORY     Past Medical History:   Diagnosis Date    Alcohol abuse     Cataract     L eye    DVT (deep venous thrombosis) (HCC)     LLE    Hypercholesterolemia     Pulmonary emboli (HCC)     Seizures (HCC)     Thyroid cyst     Type II diabetes mellitus ([facility] [address]          SURGICAL HISTORY       Past Surgical History:   Procedure Laterality Date    CHOLECYSTECTOMY, LAPAROSCOPIC           CURRENT MEDICATIONS     Current Discharge Medication List        CONTINUE these medications which have NOT CHANGED    Details   insulin glargine, 1 unit dial, (TOUJEO) 300 UNIT/ML concentrated injection pen Inject 56 Units into the skin nightly      amLODIPine-benazepril (LOTREL) 10-40 MG per capsule Take 1 capsule by mouth daily      nebivolol (BYSTOLIC) 10 MG tablet Take by mouth daily      Dulaglutide (TRULICITY) 1.5 WH/1.7TI SOPN Inject 1.5 mg into the skin once a week      FLUoxetine (PROZAC) 20 MG capsule Take 40 mg by mouth daily In am      diphenhydrAMINE (BENADRYL) 25 MG capsule Take 25 mg by mouth every 6 hours as needed for Itching      b complex vitamins capsule Take 1 capsule by mouth daily      cyclobenzaprine (FLEXERIL) 10 MG tablet Take 10 mg by mouth 3 times daily      diclofenac sodium (VOLTAREN) 1 % GEL Apply 4 g topically 3 times daily as needed for Pain      Omega-3 1000 MG CAPS Take by mouth 2 times daily      fenofibrate (TRICOR) 145 MG tablet Take 145 mg by mouth daily      rosuvastatin (CRESTOR) 10 MG tablet Take 10 mg by mouth daily      mupirocin (BACTROBAN) 2 % ointment Apply topically 3 times daily Apply topically 3 times daily. omeprazole (PRILOSEC) 40 MG delayed release capsule Take 40 mg by mouth daily      HYDROcodone-acetaminophen (NORCO) 7.5-325 MG per tablet Take 1 tablet by mouth every 6 hours as needed for Pain. ALLERGIES     Penicillins    FAMILY HISTORY       Family History   Problem Relation Age of Onset    Cancer Mother 80        Lung    Cancer Father 79        renal           SOCIAL HISTORY       Social History     Socioeconomic History    Marital status:    Tobacco Use    Smoking status: Former     Packs/day: 2.00     Years: 44.00     Pack years: 88.00     Types: Cigarettes     Start date: 0     Quit date:      Years since quittin.1    Smokeless tobacco: Never   Vaping Use    Vaping Use: Never used   Substance and Sexual Activity    Alcohol use: Yes     Comment: 2-3 glasses of Jack/Coke per day    Drug use: Yes     Types: Marijuana (Weed)     Comment: occassional    Sexual activity: Never       SCREENINGS    [place] Coma Scale  Eye Opening: Spontaneous  Best Verbal Response: Oriented  Best Motor Response: Obeys commands  Kassandra Coma Scale Score: 15        PHYSICAL EXAM    (up to 7 for level 4, 8 or more for level 5)     ED Triage Vitals [23]   BP Temp Temp src Heart Rate Resp SpO2 Height Weight   (!) 144/77 98.3 °F (36.8 °C) -- 92 18 96 % 5' 3\" (1.6 m) 175 lb (79.4 kg)       Physical Exam  Vitals and nursing note reviewed. Constitutional:       General: She is not in acute distress. Appearance: Normal appearance. HENT:      Head: Normocephalic and atraumatic. Right Ear: External ear normal.      Left Ear: External ear normal.      Nose: Nose normal. No congestion or rhinorrhea. Mouth/Throat:      Mouth: Mucous membranes are moist.      Pharynx: Oropharynx is clear. No oropharyngeal exudate or posterior oropharyngeal erythema. Comments: Tongue abrasions anteriorly  Eyes:      General: No scleral icterus. Conjunctiva/sclera: Conjunctivae normal.      Pupils: Pupils are equal, round, and reactive to light. Cardiovascular:      Rate and Rhythm: Normal rate and regular rhythm. Pulses: Normal pulses. Heart sounds: Normal heart sounds. Pulmonary:      Effort: Pulmonary effort is normal. No respiratory distress. Breath sounds: Normal breath sounds. No stridor. No wheezing, rhonchi or rales. Abdominal:      General: Bowel sounds are normal. There is no distension. Palpations: Abdomen is soft. Tenderness: There is no abdominal tenderness. There is no guarding or rebound. Musculoskeletal:         General: No tenderness or deformity. Cervical back: Neck supple. No rigidity. Right lower leg: No edema. Left lower leg: No edema. Skin:     General: Skin is warm and dry. Capillary Refill: Capillary refill takes less than 2 seconds. Coloration: Skin is pale. Skin is not jaundiced. Neurological:      General: No focal deficit present. Mental Status: She is alert and oriented to person, place, and time. Mental status is at baseline. Cranial Nerves: No cranial nerve deficit. Sensory: No sensory deficit. Motor: No weakness.       Coordination: Coordination normal.   Psychiatric:         Mood and Affect: Mood normal.         Behavior: Behavior normal.       DIAGNOSTIC RESULTS     EKG: All EKG's areinterpreted by the Emergency Department Physician who either signs or Co-signs this chart in the absence of a cardiologist.    EKG dated 3/4/2023 at 2030 9 PM: Normal sinus rhythm, rate 90. DE interval 214-prolonged. Low voltage. Abnormal R wave progression. , QTc 502. RADIOLOGY:  Non-plain film images such as CT, Ultrasound and MRI are read by the radiologist. Plain radiographic images are visualized and preliminarily interpreted bythe emergency physician with the below findings:          XR CHEST PORTABLE   Final Result   Normal chest x-ray. Electronically signed by Cony Spence MD on 03-04-23 at 2324      CT Head W/O Contrast   Final Result   Normal head/brain CT. Electronically signed by Cony Spence MD on 03-04-23 at [address]:  Labs Reviewed   CBC WITH AUTO DIFFERENTIAL - Abnormal; Notable for the following components:       Result Value    RBC 3.78 (*)     MCV 99.7 (*)     MCH 32.3 (*)     MCHC 32.4 (*)     Platelets 124 (*)     All other components within normal limits   COMPREHENSIVE METABOLIC PANEL - Abnormal; Notable for the following components:    CO2 18 (*)     Glucose 125 (*)     BUN 45 (*)     Creatinine 2.9 (*)     Est, Glom Filt Rate 18 (*)     Calcium 8.3 (*)     Alkaline Phosphatase 195 (*)     All other components within normal limits   URINALYSIS WITH REFLEX TO CULTURE - Abnormal; Notable for the following components:    Glucose, Ur 100 (*)     Blood, Urine SMALL (*)     All other components within normal limits   MICROSCOPIC URINALYSIS - Abnormal; Notable for the following components:    Bacteria, UA NEGATIVE (*)     Crystals, UA NEG (*)     All other components within normal limits   POCT GLUCOSE - Abnormal; Notable for the following components:    POC Glucose 138 (*)     All other components within normal limits   COVID-19, RAPID   TROPONIN   POCT GLUCOSE   POCT GLUCOSE   POCT GLUCOSE   POCT GLUCOSE       All other labs were within normal range or not returned as of this dictation.     EMERGENCY DEPARTMENT COURSE and DIFFERENTIAL DIAGNOSIS/MDM:   Vitals:    Vitals:    03/04/23 2037 03/05/23 0130 03/05/23 0430 03/05/23 0548   BP: (!) 144/77 (!) 152/86 131/78 118/78   Pulse: 92 71 65 65   Resp: 18 17  17   Temp: 98.3 °F (36.8 °C)  98.1 °F (36.7 °C) 97.5 °F (36.4 °C)   TempSrc:   Oral Tympanic   SpO2: 96% 95% 95%    Weight: 175 lb (79.4 kg)      Height: 5' 3\" (1.6 m)          MDM     Amount and/or Complexity of Data Reviewed  Clinical lab tests: reviewed  Tests in the radiology section of CPT®: reviewed  Decide to obtain previous medical records or to obtain history from someone other than the patient: yes    69-year-old female presents after seizure activity at work. Seizure was witnessed and lasted approximately 7 minutes with blue color change due to difficulty breathing. Lab, EKG, and radiology results reviewed. Discussed with Dr. Otis Amaro, neurologist, who advised to load Keppra 1 g IV in the emergency department. He will consult in the morning. 03:45 Discussed with Dr. John Hameed, on-call for Dr. Richard Guy, who will admit the patient for further evaluation and treatment. After explaining to patient that her kidney enzymes had elevated with creatinine from 2.1 to 2.9, patient states that she has seen a nephrologist and [address] [place] [address]. Patient has not seen him for over a year due to [facility]. CONSULTS:  IP CONSULT TO NEPHROLOGY  IP CONSULT TO NEUROLOGY    PROCEDURES:  Unless otherwise noted below, none     Procedures    FINAL IMPRESSION      1.  Seizure ([facility] [address]    2. Stage 4 chronic kidney disease ([facility] [address]          DISPOSITION/PLAN   DISPOSITION Admitted 03/05/2023 03:56:14 AM               (Please note that portions of this note were completed with a voice recognition program.  Efforts were made to edit thedictations but occasionally words are mis-transcribed.)    Shelley Webb MD (electronically signed)  Attending Emergency Physician          Shelley Webb MD  03/05/23 1001

## 2023-03-05 NOTE — ED NOTES
Pt's friend spoke with a coworker that witnessed event.  Coworkers stated that it appears pt had a \"grand mal seizure, she bit her tongue and her lips went blue, it lasted about 7 minutes\"     Gayla Rice RN  03/05/23 3334

## 2023-03-05 NOTE — CONSULTS
[address], [address]  [place], [place] [address]  Phone [phone]     Neurology Consultation     Date of Admission: 3/4/2023  8:31 PM  Date of Consultation: 3/5/23    Attending Provider: Alexandra Buckley MD  Consulting Provider: Noam Germain M.D. Patient: Melany Ferris  :  1965  Age:  59 y.o. MRN:  [id number]    CHIEF COMPLAINT:  seizure    History Source: History obtained from chart review and the patient. PCP: Alexandra Buckley MD    HISTORY OF PRESENT ILLNESS:   Melany Ferris is a 59y.o. year old woman with a history of alcohol abuse who is admitted after having a prolonged grand mal seizure at work and having acute on chronic kidney injury. Back in , passed out at work. She did not complete an evaluation. 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 Last night, she was at work when she developed some visual disturbance described as bright flashes. She felt like she may pass out and was able to lower herself to the floor. She then passed out and had convulsions. The seizure was said by a coworker to have lasted 7 minutes. She bit her tongue but there was no incontinence. She was drowsy and confused for a short while afterward. CT head was normal.  EKG was normal.  Labs showed acute on chronic renal injury. She was admitted for further evaluation. She has no history of head injury, stroke, meningitis, or encephalitis. She admits that she drinks heavily. She will often go weeks or longer without drinking alcohol.       PAST MEDICAL HISTORY:    Medical History:      Diagnosis Date    Alcohol abuse     Cataract     L eye    DVT (deep venous thrombosis) (HCC)     LLE    Hypercholesterolemia     Pulmonary emboli (HCC)     Seizures (HCC)     Thyroid cyst     Type II diabetes mellitus ([facility] [address]        Surgical History:      Procedure Laterality Date    CHOLECYSTECTOMY, LAPAROSCOPIC         Medications Prior to Admission:    Prior to Admission medications    Medication Sig Start Date End Date Taking? Authorizing Provider   insulin glargine, 1 unit dial, (TOUJEO) 300 UNIT/ML concentrated injection pen Inject 56 Units into the skin nightly    Historical Provider, MD   amLODIPine-benazepril (LOTREL) 10-40 MG per capsule Take 1 capsule by mouth daily    Historical Provider, MD   nebivolol (BYSTOLIC) 10 MG tablet Take by mouth daily    Historical Provider, MD   Dulaglutide (TRULICITY) 1.5 ZA/0.9VP SOPN Inject 1.5 mg into the skin once a week  Patient not taking: No sig reported    Historical Provider, MD   FLUoxetine (PROZAC) 20 MG capsule Take 40 mg by mouth daily In am    Historical Provider, MD   diphenhydrAMINE (BENADRYL) 25 MG capsule Take 25 mg by mouth every 6 hours as needed for Itching    Historical Provider, MD   b complex vitamins capsule Take 1 capsule by mouth daily  Patient not taking: No sig reported    Historical Provider, MD   cyclobenzaprine (FLEXERIL) 10 MG tablet Take 10 mg by mouth 3 times daily    Historical Provider, MD   diclofenac sodium (VOLTAREN) 1 % GEL Apply 4 g topically 3 times daily as needed for Pain    Historical Provider, MD   [place]-3 1000 MG CAPS Take by mouth 2 times daily    Historical Provider, MD   fenofibrate (TRICOR) 145 MG tablet Take 145 mg by mouth daily  Patient not taking: Reported on 3/4/2023    Historical Provider, MD   rosuvastatin (CRESTOR) 10 MG tablet Take 10 mg by mouth daily    Historical Provider, MD   mupirocin (BACTROBAN) 2 % ointment Apply topically 3 times daily Apply topically 3 times daily. Historical Provider, MD   omeprazole (PRILOSEC) 40 MG delayed release capsule Take 40 mg by mouth daily    Historical Provider, MD   HYDROcodone-acetaminophen (NORCO) 7.5-325 MG per tablet Take 1 tablet by mouth every 6 hours as needed for Pain.     Historical Provider, MD       Current Medications:  Current Facility-Administered Medications: sodium chloride flush 0.9 % injection 5-40 mL, 5-40 mL, IntraVENous, 2 times per day  sodium chloride flush 0.9 % injection 5-40 mL, 5-40 mL, IntraVENous, PRN  0.9 % sodium chloride infusion, , IntraVENous, PRN  enoxaparin Sodium (LOVENOX) injection 30 mg, 30 mg, SubCUTAneous, Daily  acetaminophen (TYLENOL) tablet 650 mg, 650 mg, Oral, Q4H PRN  ondansetron (ZOFRAN-ODT) disintegrating tablet 4 mg, 4 mg, Oral, Q8H PRN **OR** ondansetron (ZOFRAN) injection 4 mg, 4 mg, IntraVENous, Q6H PRN  levETIRAcetam (KEPPRA) tablet 500 mg, 500 mg, Oral, BID  LORazepam (ATIVAN) injection 1 mg, 1 mg, IntraVENous, Q4H PRN  glucose chewable tablet 16 g, 4 tablet, Oral, PRN  dextrose bolus 10% 125 mL, 125 mL, IntraVENous, PRN **OR** dextrose bolus 10% 250 mL, 250 mL, IntraVENous, PRN  glucagon (rDNA) injection 1 mg, 1 mg, SubCUTAneous, PRN  dextrose 10 % infusion, , IntraVENous, Continuous PRN  FLUoxetine (PROZAC) capsule 40 mg, 40 mg, Oral, Daily  HYDROcodone-acetaminophen (NORCO) 7.5-325 MG per tablet 1 tablet, 1 tablet, Oral, Q6H PRN  pantoprazole (PROTONIX) tablet 40 mg, 40 mg, Oral, QAM AC  rosuvastatin (CRESTOR) tablet 10 mg, 10 mg, Oral, Nightly  insulin lispro (HUMALOG) injection vial 0-4 Units, 0-4 Units, SubCUTAneous, TID WC  insulin lispro (HUMALOG) injection vial 0-4 Units, 0-4 Units, SubCUTAneous, Nightly  sodium bicarbonate tablet 650 mg, 650 mg, Oral, BID    Allergies:  Penicillins    Habits:  TOBACCO:   reports that she quit smoking about 20 years ago. Her smoking use included cigarettes. She started smoking about 42 years ago. She has a 88.00 pack-year smoking history. She has never used smokeless tobacco.  ETOH:   reports current alcohol use. DRUGS:    Social History     Substance and Sexual Activity   Drug Use Yes    Types: Marijuana [place])    Comment: occassional       SOCIAL HISTORY:   Greyson Garcia is , lives in [place], [place], and works at [employer].     FAMILY HISTORY:       Problem Relation Age of Onset    Cancer Mother 80 Lung    Cancer Father 79        renal        REVIEW OF SYSTEMS:  Review of Systems   Constitutional:  Negative for chills and fever. HENT:  Negative for hearing loss and tinnitus. Eyes:  Negative for photophobia and visual disturbance. Respiratory:  Negative for cough and shortness of breath. Cardiovascular:  Negative for chest pain and palpitations. Gastrointestinal:  Negative for nausea and vomiting. Endocrine: Negative for polydipsia and polyuria. Genitourinary:  Negative for frequency and urgency. Musculoskeletal:  Negative for arthralgias, back pain and neck pain. Skin:  Negative for rash and wound. Allergic/Immunologic: Negative for environmental allergies and food allergies. Neurological:  Negative for tremors, light-headedness, numbness and headaches. Hematological:  Negative for adenopathy. Does not bruise/bleed easily. Psychiatric/Behavioral:  Positive for dysphoric mood. The patient is nervous/anxious. PHYSICAL EXAMINATION:  Vitals: /74   Pulse 60   Temp 96.9 °F (36.1 °C) (Temporal)   Resp 16   Ht 5' 3\" (1.6 m)   Wt 175 lb (79.4 kg)   SpO2 98%   BMI 31.00 kg/m²   General appearance:  She is awake, alert, and cooperative. She appears older than her age. Skin: Skin color, texture, turgor normal. No rashes or lesions  HEENT: Head: Normal, normocephalic, atraumatic. Neck:no adenopathy, no carotid bruit, no JVD, supple, symmetrical, trachea midline, and thyroid not enlarged, symmetric, no tenderness/mass/nodules  Lungs: clear to auscultation bilaterally  Heart: regular rate and rhythm, S1, S2 normal, no murmur, click, rub or gallop  Extremities: extremities normal, atraumatic, no cyanosis or edema      NEUROLOGIC EXAMINATION:  Neurologic Exam     Mental Status   Oriented to person, place, and time. Speech: speech is normal   Level of consciousness: alert  Speech is fluent. Cranial Nerves     CN II   Visual fields full to confrontation.      CN III, IV, VI   Pupils are equal, round, and reactive to light. Extraocular motions are normal.     CN VII   Facial expression full, symmetric.      CN VIII   Hearing: intact    CN IX, X   Palate: symmetric    CN XI   Right sternocleidomastoid strength: normal  Left sternocleidomastoid strength: normal  Right trapezius strength: normal  Left trapezius strength: normal    CN XII   Tongue: not atrophic  Fasciculations: absent  Tongue deviation: none    Motor Exam   Muscle bulk: normal  Overall muscle tone: normal  Right arm pronator drift: absent  Left arm pronator drift: absent    Strength   Right neck flexion: 5/5  Left neck flexion: 5/5  Right neck extension: 5/5  Left neck extension: 5/5  Right deltoid: 5/5  Left deltoid: 5/5  Right biceps: 5/5  Left biceps: 5/5  Right triceps: 5/5  Left triceps: 5/5  Right wrist flexion: 5/5  Left wrist flexion: 5/5  Right wrist extension: 5/5  Left wrist extension: 5/5  Right interossei: 5/5  Left interossei: 5/5  Right iliopsoas: 5/5  Left iliopsoas: 5/5  Right quadriceps: 5/5  Left quadriceps: 5/5  Right glutei: 5/5  Left glutei: 5/5  Right anterior tibial: 5/5  Left anterior tibial: 5/5  Right posterior tibial: 5/5  Left posterior tibial: 5/5  Right peroneal: 5/5  Left peroneal: 5/5  Right gastroc: 5/5  Left gastroc: 5/5  Rapid alternating movements were normal.     Sensory Exam   Light touch normal.   Vibration normal.   Lowest sensation to pinprick on right: T2    Gait, Coordination, and Reflexes     Gait  Gait: normal    Coordination   Finger to nose coordination: normal  Heel to shin coordination: normal    Tremor   Resting tremor: absent  Intention tremor: absent  Action tremor: absent    Reflexes   Right brachioradialis: 2+  Left brachioradialis: 2+  Right biceps: 2+  Left biceps: 2+  Right triceps: 2+  Left triceps: 2+  Right patellar: 2+  Left patellar: 2+  Right achilles: 2+  Left achilles: 2+  Right plantar: normal  Left plantar: normal  Right Kaye: absent  Left Kaye: absent  Right ankle clonus: absent  Left ankle clonus: absent    ADDITIONAL REVIEW:  CBC:    Recent Labs     03/04/23 2049   WBC 5.6   HGB 12.2   *     BMP:     Recent Labs     03/04/23 2049      K 4.4      CO2 18*   BUN 45*   CREATININE 2.9*   GLUCOSE 125*     Hepatic:  Recent Labs     03/04/23 2049   AST 31   ALT 19   BILITOT 0.3   ALKPHOS 195*     Troponin T:    Recent Labs     03/04/23 2049   TROPONINI <0.01     Narrative   Patient: Sean Guy  Time Out: 23:25Exam(s): CT HEAD Without Contrast EXAM:  CT Head Without Intravenous ContrastCLINICAL HISTORY:   Reason for exam: lOC, ? Seizure. TECHNIQUE:  Axial computed tomography images of the head/brain without intravenous    contrast.COMPARISON:  No relevant prior studies available. FINDINGS:  Brain:  Unremarkable. No hemorrhage. No significant white matter disease. No edema. Ventricles:  Unremarkable. No ventriculomegaly. Bones/joints:  Unremarkable. No acute    fracture. Soft tissues:  Unremarkable. Sinuses:  Unremarkable as visualized. No acute sinusitis. Mastoid air cells:  Unremarkable as visualized. No mastoid effusion. Impression   Normal head/brain CT. Electronically signed by Jake Gomez MD on 03-04-23 at [address] [place] [address] [place]:  1. New onset seizure. This may be related to the alcohol. I discussed the diagnosis, pathophysiology, symptoms, risks, prognosis, and treatment options of siezures with Angle Lechuga. I discussed seizure precautions.  -Provided education and counseling:  - Discussed seizure precautions. - If has a seizure with loss of awareness, no driving until 3 months free of such a seizure. - Regarding the importance of taking their medications as prescribed.    - Safety issues were discussed including safety from falls or other dangerous situations, first aid during and after an acute seizure, to take showers, not baths, avoid swimming alone, avoid operating heavy machinery, avoid heights, avoid proximity to open fires/flames, high impact sports, and bodies of water (lakes, rivers, swimming pools) unless accompanied by another.   - Avoid situation that can enhance seizure recurrence such as lack of sleep, undue stress and fatigue, excessive alcohol consumption and use of recreational drugs. I discussed driving and seizures and laws regarding them. [place] one with a seizure to drive until they are free from seizure for 6 months. 2. Acute on chronic kidney disease  3. Diabetes  4. Hypertension  5. Hyperlipiedemia    PLAN:  1. Keppra  2. DIscussed alcohol issues and seizures and driving  3. EEG  4. MRI    Feli Benedict M.D. I spent 75 total minutes in face to face interaction with patient and coordination of care.    I spent > 50% of the total time discussing the diagnoses, evaluation, test results, treatment options, plans; counseling and advising with the patient and/or family and/or caregiver as well as with the care team.    Electronically signed by Feli Benedict M.D.

## 2023-03-05 NOTE — ED NOTES
Pt is gowned. Patient placed on cardiac monitor, continuous pulse oximeter, and NIBP monitor. Monitor alarms on. Seizure mats applied.       Jareth Campos RN  03/04/23 2044

## 2023-03-06 ENCOUNTER — TELEPHONE (OUTPATIENT)
Dept: NEUROLOGY | Age: 58
End: 2023-03-06

## 2023-03-06 VITALS
TEMPERATURE: 96.6 F | DIASTOLIC BLOOD PRESSURE: 62 MMHG | SYSTOLIC BLOOD PRESSURE: 116 MMHG | WEIGHT: 175 LBS | OXYGEN SATURATION: 98 % | BODY MASS INDEX: 31.01 KG/M2 | RESPIRATION RATE: 18 BRPM | HEART RATE: 58 BPM | HEIGHT: 63 IN

## 2023-03-06 LAB
ANION GAP SERPL CALCULATED.3IONS-SCNC: 12 MMOL/L (ref 7–19)
BUN BLDV-MCNC: 37 MG/DL (ref 6–20)
CALCIUM SERPL-MCNC: 8.6 MG/DL (ref 8.6–10)
CHLORIDE BLD-SCNC: 110 MMOL/L (ref 98–111)
CO2: 22 MMOL/L (ref 22–29)
CREAT SERPL-MCNC: 2.5 MG/DL (ref 0.5–0.9)
CREATININE URINE: 37.1 MG/DL (ref 4.2–622)
EKG P AXIS: 61 DEGREES
EKG P-R INTERVAL: 208 MS
EKG Q-T INTERVAL: 406 MS
EKG QRS DURATION: 96 MS
EKG QTC CALCULATION (BAZETT): 458 MS
EKG T AXIS: 59 DEGREES
EOSINOPHIL,URINE: NORMAL
GFR SERPL CREATININE-BSD FRML MDRD: 22 ML/MIN/{1.73_M2}
GLUCOSE BLD-MCNC: 115 MG/DL (ref 70–99)
GLUCOSE BLD-MCNC: 133 MG/DL (ref 70–99)
GLUCOSE BLD-MCNC: 147 MG/DL (ref 70–99)
GLUCOSE BLD-MCNC: 157 MG/DL (ref 70–99)
GLUCOSE BLD-MCNC: 37 MG/DL (ref 70–99)
GLUCOSE BLD-MCNC: 45 MG/DL (ref 74–109)
GLUCOSE BLD-MCNC: 48 MG/DL (ref 70–99)
GLUCOSE BLD-MCNC: 98 MG/DL (ref 70–99)
MAGNESIUM: 1.9 MG/DL (ref 1.6–2.6)
PARATHYROID HORMONE INTACT: 91.2 PG/ML (ref 15–65)
PERFORMED ON: ABNORMAL
PERFORMED ON: NORMAL
PHOSPHORUS: 4.5 MG/DL (ref 2.5–4.5)
POTASSIUM SERPL-SCNC: 4.6 MMOL/L (ref 3.5–5)
PROTEIN PROTEIN: 63 MG/DL (ref 15–45)
SODIUM BLD-SCNC: 144 MMOL/L (ref 136–145)
SODIUM URINE: 46 MMOL/L
UREA NITROGEN, UR: 241 MG/DL
URIC ACID, SERUM: 8.8 MG/DL (ref 2.4–5.7)
VITAMIN D 25-HYDROXY: 13.4 NG/ML

## 2023-03-06 PROCEDURE — 80048 BASIC METABOLIC PNL TOTAL CA: CPT

## 2023-03-06 PROCEDURE — 6370000000 HC RX 637 (ALT 250 FOR IP): Performed by: INTERNAL MEDICINE

## 2023-03-06 PROCEDURE — 96361 HYDRATE IV INFUSION ADD-ON: CPT

## 2023-03-06 PROCEDURE — 82570 ASSAY OF URINE CREATININE: CPT

## 2023-03-06 PROCEDURE — 82962 GLUCOSE BLOOD TEST: CPT

## 2023-03-06 PROCEDURE — 94760 N-INVAS EAR/PLS OXIMETRY 1: CPT

## 2023-03-06 PROCEDURE — 82306 VITAMIN D 25 HYDROXY: CPT

## 2023-03-06 PROCEDURE — 36415 COLL VENOUS BLD VENIPUNCTURE: CPT

## 2023-03-06 PROCEDURE — 83735 ASSAY OF MAGNESIUM: CPT

## 2023-03-06 PROCEDURE — 84100 ASSAY OF PHOSPHORUS: CPT

## 2023-03-06 PROCEDURE — 2580000003 HC RX 258: Performed by: FAMILY MEDICINE

## 2023-03-06 PROCEDURE — 96372 THER/PROPH/DIAG INJ SC/IM: CPT

## 2023-03-06 PROCEDURE — 87205 SMEAR GRAM STAIN: CPT

## 2023-03-06 PROCEDURE — 83970 ASSAY OF PARATHORMONE: CPT

## 2023-03-06 PROCEDURE — 6360000002 HC RX W HCPCS: Performed by: FAMILY MEDICINE

## 2023-03-06 PROCEDURE — 84156 ASSAY OF PROTEIN URINE: CPT

## 2023-03-06 PROCEDURE — 84540 ASSAY OF URINE/UREA-N: CPT

## 2023-03-06 PROCEDURE — 99232 SBSQ HOSP IP/OBS MODERATE 35: CPT | Performed by: PSYCHIATRY & NEUROLOGY

## 2023-03-06 PROCEDURE — 6370000000 HC RX 637 (ALT 250 FOR IP): Performed by: FAMILY MEDICINE

## 2023-03-06 PROCEDURE — 84300 ASSAY OF URINE SODIUM: CPT

## 2023-03-06 PROCEDURE — 93010 ELECTROCARDIOGRAM REPORT: CPT | Performed by: INTERNAL MEDICINE

## 2023-03-06 PROCEDURE — 84550 ASSAY OF BLOOD/URIC ACID: CPT

## 2023-03-06 PROCEDURE — G0378 HOSPITAL OBSERVATION PER HR: HCPCS

## 2023-03-06 RX ORDER — ERGOCALCIFEROL 1.25 MG/1
50000 CAPSULE ORAL WEEKLY
Status: DISCONTINUED | OUTPATIENT
Start: 2023-03-06 | End: 2023-03-06 | Stop reason: HOSPADM

## 2023-03-06 RX ORDER — LEVETIRACETAM 500 MG/1
500 TABLET ORAL 2 TIMES DAILY
Qty: 60 TABLET | Refills: 3 | Status: SHIPPED | OUTPATIENT
Start: 2023-03-06

## 2023-03-06 RX ADMIN — FLUOXETINE HYDROCHLORIDE 40 MG: 20 CAPSULE ORAL at 09:30

## 2023-03-06 RX ADMIN — DEXTROSE MONOHYDRATE 250 ML: 100 INJECTION, SOLUTION INTRAVENOUS at 01:53

## 2023-03-06 RX ADMIN — SODIUM CHLORIDE, PRESERVATIVE FREE 10 ML: 5 INJECTION INTRAVENOUS at 09:30

## 2023-03-06 RX ADMIN — SODIUM BICARBONATE 650 MG: 650 TABLET ORAL at 09:30

## 2023-03-06 RX ADMIN — LEVETIRACETAM 500 MG: 500 TABLET, FILM COATED ORAL at 09:30

## 2023-03-06 RX ADMIN — PANTOPRAZOLE SODIUM 40 MG: 40 TABLET, DELAYED RELEASE ORAL at 05:30

## 2023-03-06 RX ADMIN — ERGOCALCIFEROL 50000 UNITS: 1.25 CAPSULE ORAL at 14:00

## 2023-03-06 RX ADMIN — DEXTROSE MONOHYDRATE 250 ML: 100 INJECTION, SOLUTION INTRAVENOUS at 05:34

## 2023-03-06 RX ADMIN — ENOXAPARIN SODIUM 30 MG: 100 INJECTION SUBCUTANEOUS at 09:30

## 2023-03-06 RX ADMIN — DEXTROSE MONOHYDRATE: 10 INJECTION, SOLUTION INTRAVENOUS at 05:57

## 2023-03-06 NOTE — PROCEDURES
[facility]  Neurophysiology Department  [address] [place] [address]  Phone [phone]  Fax [phone]     ELECTROENCEPHALOGRAM  Patient Data  Patient Name Hansa Hernandez Referring Provider Hazel Andrews M.D. Account Number [de-identified] Interpreting physician Hazel Andrews M.D.   Nilesh Hauser 1965 Technologist Tawanna Simmons   Age 59 Test Electroencephalography   Indications for the test intractable seizures Date of test 3/3/2023       HISTORY:   Avery Hernandez is a 59year old woman with a history of alcohol abuse who had a prolonged seizure. SUMMARY OF THE RECORDING:   A symmetrical background rhythm of 9 Hertz was present in the posterior regions. This attenuates with eye opening. Occasional beta activity is noted. During the recording, the patient becomes drowsy and sleep is attained. Photic stimulation does not elicit a driving response and hyperventilation does not produce a build up effect. No lateralizing or epileptiform abnormalities were identified. IMPRESSION:   Normal awake and asleep EEG.                              Hazel Andrews M.D.

## 2023-03-06 NOTE — DISCHARGE SUMMARY
Discharge Summary    Patient ID: Gerardo Lamb  MRN: [id number]     Acct:  [de-identified]       Patient's PCP: Justin Singh MD    Admit Date: 3/4/2023     Discharge Date:   3/6/2023    Admitting Physician: Justin Singh MD    Discharge Physician: Mike Devries MD     Active Discharge Diagnoses:    Primary Problem  Seizure [facility])  [place] Problems    Diagnosis Date Noted    Seizure [facility]) [R56.9] 03/05/2023     Priority: Medium    Acute renal failure superimposed on stage 3b chronic kidney disease ([facility] [address] [N17.9, N18.32] 03/05/2023     Priority: Medium    Type 2 diabetes mellitus with stage 3b chronic kidney disease, without long-term current use of insulin ([facility] [E11.22, N18.32] 03/05/2023     Priority: Medium    Primary hypertension [I10] 03/05/2023     Priority: Medium    Mixed hyperlipidemia [E78.2] 03/05/2023     Priority: Medium    New onset seizure [facility]) [R56.9] 03/05/2023     Priority: Medium       The patient was seen and examined on day of discharge and this discharge summary is in conjunction with the daily progress note from day of discharge. Code Status:  Full Code    Hospital Course:   78-year-old female with insulin-dependent type 2 diabetes and prior alcohol abuse history who presented with a prolonged seizure. Reports less drinking of late but has had some hypoglycemic episodes. Reports that she was feeling mildly hypoglycemic when her seizure occurred. Underwent complete work-up with MRI and EEG both of which were normal.  Patient started on Keppra and given driving restriction for 6 months as [place] in [place]. She will follow-up with neurology. She did have some low glucose in the hospital which required D10 treatment. Advised her to be cautious with her glucose regimen at home and replace her Dexcom when she gets home. She also had a an AMY on CKD stage IV. Nephrology was consulted and she was treated with IV fluids.   Creatinine down trended closer to her baseline. We will check at her follow-up appointment. Encourage good fluid hydration. The patient was admitted for the above noted medical/surgical issues. Please see daily progress note for further details concerning their stay. The patient improved throughout their stay and reached maximum medical improvement on the day of discharge. The patient/family agree with the treatment plan as outlined above. All questions concerning their stay were answered prior to discharge. They understand the importance of follow up concerning any abnormal test results. Consults:  IP CONSULT TO NEPHROLOGY  IP CONSULT TO NEUROLOGY    Disposition: Home    Discharged Condition: Stable    Follow Up: Kallie Munguia MD  [address] [facility] [phone]    Follow up in 1 week(s)      Kallie Munguia MD  [address] [facility] [phone]          Lab Frequency Next Occurrence   Up as tolerated PRN    HYPOGLYCEMIA TREATMENT: blood glucose LESS THAN 70 mg/dL and patient ALERT and TOLERATING PO PRN    HYPOGLYCEMIA TREATMENT: blood glucose LESS THAN 70 mg/dL and patient NOT ALERT or NPO PRN    POCT Glucose PRN    POCT Glucose 4 TIMES DAILY BEFORE MEALS & AT BEDTIME        Diet: ADULT DIET;  Regular; 4 carb choices (60 gm/meal)    Discharge Medications:      Medication List        START taking these medications      levETIRAcetam 500 MG tablet  Commonly known as: KEPPRA  Take 1 tablet by mouth 2 times daily            CONTINUE taking these medications      amLODIPine-benazepril 10-40 MG per capsule  Commonly known as: LOTREL     cyclobenzaprine 10 MG tablet  Commonly known as: FLEXERIL     diclofenac sodium 1 % Gel  Commonly known as: VOLTAREN     diphenhydrAMINE 25 MG capsule  Commonly known as: BENADRYL     FLUoxetine 20 MG capsule  Commonly known as: PROZAC     HYDROcodone-acetaminophen 7.5-325 MG per tablet  Commonly known as: NORCO     insulin glargine (1 unit dial) 300 UNIT/ML concentrated injection pen  Commonly known as: TOUJEO     mupirocin 2 % ointment  Commonly known as: BACTROBAN     nebivolol 10 MG tablet  Commonly known as: BYSTOLIC     [place]-3 2428 MG Caps     omeprazole 40 MG delayed release capsule  Commonly known as: PRILOSEC     rosuvastatin 10 MG tablet  Commonly known as: CRESTOR            STOP taking these medications      b complex vitamins capsule     fenofibrate 145 MG tablet  Commonly known as: TRICOR     Trulicity 1.5 EH/3.7YW SC injection  Generic drug: dulaglutide               Where to Get Your Medications        [facility] [address] [phone] - F [phone]  [address] [place] [address], [address] [place] [address] [address] [address]      Phone: [phone]   levETIRAcetam 500 MG tablet         Time Spent on discharge is 38 minutes in patient examination, evaluation, counseling as well as medication reconciliation, prescriptions for required medications, discharge planning and follow up.     Electronically signed by Kyrie Coleman MD on 3/6/2023 at 8:46 AM

## 2023-03-06 NOTE — PLAN OF CARE
Problem: Pain  Goal: Verbalizes/displays adequate comfort level or baseline comfort level  Outcome: Adequate for Discharge     Problem: ABCDS Injury Assessment  Goal: Absence of physical injury  Outcome: Adequate for Discharge     Problem: Chronic Conditions and Co-morbidities  Goal: Patient's chronic conditions and co-morbidity symptoms are monitored and maintained or improved  3/6/2023 1652 by Dior Spencer RN  Outcome: Adequate for Discharge  3/6/2023 1228 by Dior Spencer RN  Flowsheets (Taken 3/6/2023 1228)  Care Plan - Patient's Chronic Conditions and Co-Morbidity Symptoms are Monitored and Maintained or Improved: Monitor and assess patient's chronic conditions and comorbid symptoms for stability, deterioration, or improvement

## 2023-03-06 NOTE — PLAN OF CARE
Problem: Discharge Planning  Goal: Discharge to home or other facility with appropriate resources  Recent Flowsheet Documentation  Taken 3/6/2023 7019 by Zane Stover RN  Discharge to home or other facility with appropriate resources: Identify barriers to discharge with patient and caregiver

## 2023-03-06 NOTE — PROGRESS NOTES
[address], [address]  [facility], [facility]  Phone [phone]     Neurology Progress Note  3/6/2023 9:12 AM  Information:   Patient Name: Adelia Ramsey  :   1965  Age:   59 y.o. MRN:   [id number]  Account #:  [de-identified]  Admit Date:   3/4/2023  Today:  3/6/23     ADMIT DX:   Seizure ([address]    Subjective:     Adelia Ramsey is a 59y.o. year old woman with a history of alcohol abuse who is admitted after having a prolonged grand mal seizure at work and having acute on chronic kidney injury. Interval History:   No further seizures. She has been ambulating to the . She denies chest pain, palpitations, dyspnea.       Objective:     Past Medical History:  Past Medical History:   Diagnosis Date    Alcohol abuse     Cataract     L eye    DVT (deep venous thrombosis) (HCC)     LLE    Hypercholesterolemia     Pulmonary emboli (HCC)     Seizures (HCC)     Thyroid cyst     Type II diabetes mellitus (HCC)        Past Surgical History:   Procedure Laterality Date    CHOLECYSTECTOMY, LAPAROSCOPIC         Family History   Problem Relation Age of Onset    Cancer Mother 80        Lung    Cancer Father 79        renal        Social History     Socioeconomic History    Marital status:      Spouse name: Not on file    Number of children: Not on file    Years of education: Not on file    Highest education level: Not on file   Occupational History    Not on file   Tobacco Use    Smoking status: Former     Packs/day: 2.00     Years: 44.00     Pack years: 88.00     Types: Cigarettes     Start date: 0     Quit date:      Years since quittin.1    Smokeless tobacco: Never   Vaping Use    Vaping Use: Never used   Substance and Sexual Activity    Alcohol use: Yes     Comment: 2-3 glasses of Manjit/Coke per day    Drug use: Yes     Types: Marijuana Layne Amble)     Comment: occassional    Sexual activity: Never   Other Topics Concern    Not on file   Social History Narrative    Not on file     Social Determinants of Health     Financial Resource Strain: Not on file   Food Insecurity: Not on file   Transportation Needs: Not on file   Physical Activity: Not on file   Stress: Not on file   Social Connections: Not on file   Intimate Partner Violence: Not on file   Housing Stability: Not on file       Medications:   sodium chloride      dextrose 100 mL/hr at 03/06/23 0557      sodium chloride flush  5-40 mL IntraVENous 2 times per day    enoxaparin  30 mg SubCUTAneous Daily    levETIRAcetam  500 mg Oral BID    FLUoxetine  40 mg Oral Daily    pantoprazole  40 mg Oral QAM AC    rosuvastatin  10 mg Oral Nightly    insulin lispro  0-4 Units SubCUTAneous TID WC    insulin lispro  0-4 Units SubCUTAneous Nightly    sodium bicarbonate  650 mg Oral BID    [Held by provider] insulin glargine  15 Units SubCUTAneous Nightly       Diagnostic Studies:  Reviewed all labs/diagnositcs since last 24hrs:  Recent Results (from the past 24 hour(s))   POCT Glucose    Collection Time: 03/05/23 11:24 AM   Result Value Ref Range    POC Glucose 218 (H) 70 - 99 mg/dl    Performed on AccuChek    POCT Glucose    Collection Time: 03/05/23  4:29 PM   Result Value Ref Range    POC Glucose 113 (H) 70 - 99 mg/dl    Performed on AccuChek    POCT Glucose    Collection Time: 03/05/23  9:02 PM   Result Value Ref Range    POC Glucose 87 70 - 99 mg/dl    Performed on AccuChek    POCT Glucose    Collection Time: 03/06/23  1:47 AM   Result Value Ref Range    POC Glucose 48 (LL) 70 - 99 mg/dl    Performed on AccuChek    Basic Metabolic Panel    Collection Time: 03/06/23  1:48 AM   Result Value Ref Range    Sodium 144 136 - 145 mmol/L    Potassium 4.6 3.5 - 5.0 mmol/L    Chloride 110 98 - 111 mmol/L    CO2 22 22 - 29 mmol/L    Anion Gap 12 7 - 19 mmol/L    Glucose 45 (LL) 74 - 109 mg/dL    BUN 37 (H) 6 - 20 mg/dL    Creatinine 2.5 (H) 0.5 - 0.9 mg/dL    Est, Glom Filt Rate 22 (A) >60    Calcium 8.6 8.6 - 10.0 mg/dL   Vitamin D 25 Hydroxy Collection Time: 03/06/23  1:48 AM   Result Value Ref Range    Vit D, 25-Hydroxy 13.4 (L) >=30 ng/mL   PTH, Intact    Collection Time: 03/06/23  1:48 AM   Result Value Ref Range    PTH 91.2 (H) 15.0 - 65.0 pg/mL   Phosphorus    Collection Time: 03/06/23  1:48 AM   Result Value Ref Range    Phosphorus 4.5 2.5 - 4.5 mg/dL   Magnesium    Collection Time: 03/06/23  1:48 AM   Result Value Ref Range    Magnesium 1.9 1.6 - 2.6 mg/dL   Uric Acid    Collection Time: 03/06/23  1:48 AM   Result Value Ref Range    Uric Acid, Serum 8.8 (H) 2.4 - 5.7 mg/dL   Sodium, urine, random    Collection Time: 03/06/23  1:57 AM   Result Value Ref Range    Sodium, Ur 46.0 mmol/L   Urea nitrogen, urine    Collection Time: 03/06/23  1:57 AM   Result Value Ref Range    Urea Nitrogen, Ur 241 mg/dL   Eosinophil Smear, Urine    Collection Time: 03/06/23  1:57 AM   Result Value Ref Range    Eosinophil, Ur Insuf Negative   Creatinine, Random Urine    Collection Time: 03/06/23  1:57 AM   Result Value Ref Range    Creatinine, Ur 37.1 4.2 - 622.0 mg/dL   Protein, urine, random    Collection Time: 03/06/23  1:57 AM   Result Value Ref Range    Protein, Ur 63 (H) 15 - 45 mg/dL   POCT Glucose    Collection Time: 03/06/23  2:20 AM   Result Value Ref Range    POC Glucose 147 (H) 70 - 99 mg/dl    Performed on AccuChek    POCT Glucose    Collection Time: 03/06/23  2:27 AM   Result Value Ref Range    POC Glucose 133 (H) 70 - 99 mg/dl    Performed on AccuChek    POCT Glucose    Collection Time: 03/06/23  5:31 AM   Result Value Ref Range    POC Glucose 37 (LL) 70 - 99 mg/dl    Performed on AccuChek    POCT Glucose    Collection Time: 03/06/23  6:04 AM   Result Value Ref Range    POC Glucose 115 (H) 70 - 99 mg/dl    Performed on AccuChek    POCT Glucose    Collection Time: 03/06/23  7:15 AM   Result Value Ref Range    POC Glucose 98 70 - 99 mg/dl    Performed on AccuChek      Narrative   Exam:   Noncontrast MRI of the brain   Technique:   Multiplanar multiecho MRI of the brain was obtained without the administration   of IV contrast.   Clinical information:   Seizures   Comparison:   Noncontrast CT of the brain performed on March 4, 2023   Findings:   Extra-axial spaces: Normal in size and morphology for the patients age. Intracranial hemorrhage: None. Ventricular system: Normal in size and morphology for the patients age. Basal cisterns: Normal.   Cerebral parenchyma: Normal for the patients age. Mild age-related involutional   changes are visualized. No evidence of restricted diffusion to suggest acute   infarct. No evidence of mass, mass effect, or edema. A few small scattered   nonspecific bilateral periventricular white matter T2/FLAIR hyperintensities are   visualized possibly on the basis of mild changes of chronic microvascular   ischemia. Midline shift: None. Posterior fossa: Normal.   Calvarium: Normal.   Vascular system: Normal flow voids. Paranasal sinuses and mastoid air cells: Clear. Visualized orbits: Normal.   Sella: Normal.   Skull base: Normal.   Marrow: Normal.       Impression   No acute intracranial process evident. Age-related involutional changes and findings compatible with mild changes of   chronic microvascular ischemia in the proper setting. Narrative   Clinical History / Reason for exam: Acute kidney injury. TECHNIQUE: Renal sonogram.   COMPARISON: None available   FINDINGS:   Right kidney: 10.5 cm in length. No hydronephrosis or calculus. Hyperechoic   renal parenchyma. Left kidney: 11.3 cm in length. No hydronephrosis or calculus. Hyperechoic renal   parenchyma. Left upper pole 1.0 cm cyst.       Impression   No hydronephrosis bilaterally. Hyperechoic bilateral renal parenchymal, most consistent with chronic medical   renal disease. EEG - normal    Diet:  ADULT DIET;  Regular; 4 carb choices (60 gm/meal)    Examination:  Vitals: /62   Pulse 58   Temp (!) 96.6 °F (35.9 °C) (Temporal)   Resp 18   Ht 5' 3\" (1.6 m)   Wt 175 lb (79.4 kg)   SpO2 98%   BMI 31.00 kg/m²    Intake/Output Summary (Last 24 hours) at 3/6/2023 0912  Last data filed at 3/6/2023 0541  Gross per 24 hour   Intake 281.44 ml   Output --   Net 281.44 ml     General appearance: alert and cooperative with exam  HEENT: Exam; heent: Head: Normal, normocephalic, atraumatic. Lungs: clear to auscultation bilaterally  Heart: regular rate and rhythm, S1, S2 normal, no murmur, click, rub or gallop  Abdomen: soft, non-tender; bowel sounds normal; no masses,  no organomegaly  Extremities: extremities normal, atraumatic, no cyanosis or edema  Neurologic:  Alert, oriented. No dysarthria. Speech is fluent. EOMI, PERRL, VFF. No facial weakness. Limb strength is normal.  No pronator drift. Deep tendon reflexes and intact and symmetrical.  Toes are downgoing. Rapid alternating movements are normal.  Finger to nose testing shows no dysmetria. Assessment:   1. New onset seizure. 2.         Acute on chronic kidney disease  3. Diabetes  4. Hypertension  5. Hyperlipiedemia  I discussed the above with her. Plan:   1. Continue Keppra 500 mg PO BID  2. No driving  3. OK neurologically to go home. She needs to follow up with me in 2 to 3 weeks     Discharge planning:   Home    I spent 35 total minutes in face to face interaction with patient and coordination of care.    I spent > 50% of the total time discussing the diagnoses, evaluation, test results, treatment options, plans; counseling and advising with the patient and/or family and/or caregiver as well as with the care team.    Electronically signed by Juju Bates M.D.

## 2023-03-06 NOTE — PROGRESS NOTES
AVS given, all questions answered; pt verbalized understanding. IV removed. All belongings collected. NAD noted.

## 2023-03-06 NOTE — H&P
History and Physical      CHIEF COMPLAINT:  Seizure    Reason for Admission:  Seizure    History Obtained From:  patient, chart    HISTORY OF PRESENT ILLNESS:      The patient is a 59 y.o. female who was admitted from ER after she had a seizure at work. She says she did have seizures as a child and had an episode recently that she initially thought was related to hypoglycemia, but now believes it was a seizure. This episode was witnessed by a co-worker. The patient has no c/o CP or SOA. She has no abdominal pain or N/V. She has no dysuria. She has has no recent illnesses.    Past Medical History:        Diagnosis Date    Alcohol abuse     Cataract     L eye    DVT (deep venous thrombosis) (HCC)     LLE    Hypercholesterolemia     Pulmonary emboli (HCC)     Seizures (HCC)     Thyroid cyst     Type II diabetes mellitus (HCC)      Past Surgical History:        Procedure Laterality Date    CHOLECYSTECTOMY, LAPAROSCOPIC           Medications Prior to Admission:    Medications Prior to Admission: insulin glargine, 1 unit dial, (TOUJEO) 300 UNIT/ML concentrated injection pen, Inject 56 Units into the skin nightly  amLODIPine-benazepril (LOTREL) 10-40 MG per capsule, Take 1 capsule by mouth daily  nebivolol (BYSTOLIC) 10 MG tablet, Take by mouth daily  Dulaglutide (TRULICITY) 1.5 QX/3.6GP SOPN, Inject 1.5 mg into the skin once a week (Patient not taking: No sig reported)  FLUoxetine (PROZAC) 20 MG capsule, Take 40 mg by mouth daily In am  diphenhydrAMINE (BENADRYL) 25 MG capsule, Take 25 mg by mouth every 6 hours as needed for Itching  b complex vitamins capsule, Take 1 capsule by mouth daily (Patient not taking: No sig reported)  cyclobenzaprine (FLEXERIL) 10 MG tablet, Take 10 mg by mouth 3 times daily  diclofenac sodium (VOLTAREN) 1 % GEL, Apply 4 g topically 3 times daily as needed for Pain  Omega-3 1000 MG CAPS, Take by mouth 2 times daily  fenofibrate (TRICOR) 145 MG tablet, Take 145 mg by mouth daily (Patient not taking: Reported on 3/4/2023)  rosuvastatin (CRESTOR) 10 MG tablet, Take 10 mg by mouth daily  mupirocin (BACTROBAN) 2 % ointment, Apply topically 3 times daily Apply topically 3 times daily. omeprazole (PRILOSEC) 40 MG delayed release capsule, Take 40 mg by mouth daily  HYDROcodone-acetaminophen (NORCO) 7.5-325 MG per tablet, Take 1 tablet by mouth every 6 hours as needed for Pain. Allergies:  Penicillins    Social History:   TOBACCO:   reports that she quit smoking about 20 years ago. Her smoking use included cigarettes. She started smoking about 42 years ago. She has a 88.00 pack-year smoking history. She has never used smokeless tobacco.  ETOH:   reports current alcohol use. DRUGS:   reports current drug use. Drug: Marijuana Valdene Lopez). OCCUPATION:  she works at the [employer]        Family History:       Problem Relation Age of Onset    Cancer Mother 80        Lung    Cancer Father 79        renal      REVIEW OF SYSTEMS:  Constitutional: neg  CV: neg  Pulmonary: neg  GI: neg  : neg  Psych: neg  Neuro: seizure  Skin: neg  MusculoSkeletal: neg  HEENT: neg  Joints: neg  Vitals:  BP (!) 141/73   Pulse 67   Temp 98.1 °F (36.7 °C) (Temporal)   Resp 16   Ht 5' 3\" (1.6 m)   Wt 175 lb (79.4 kg)   SpO2 97%   BMI 31.00 kg/m²     PHYSICAL EXAM:  Gen: NAD, alert, pleasant  HEENT: WNL  Lymph: no LAD  Neck: no JVD or masses  Chest: CTA bilat  CV: RRR  Abdomen: NT/ND  Extrem: no C/C/E  Neuro: non focal  Skin: no rashes  Joints: no redness  DATA:  I have reviewed the admission labs and imaging tests.     ASSESSMENT AND PLAN:      Principal Problem:    Seizure---follow with Neurology work up, Blanca Montoya has been started    DM2---follow glucose    CKD---some worsening of renal function, Nephrology has been consulted        Walter Juarez MD  9:57 PM 3/5/2023

## 2023-03-06 NOTE — PROGRESS NOTES
Nephrology ([facility]) Progress Note      Patient:  Kaycee Ford  YOB: 1965  Date of Service: 3/6/2023  MRN: [id number]   Acct: [de-identified]   Primary Care Physician: Dayo James MD  Advance Directive: Full Code  Admit Date: 3/4/2023       Hospital Day: 0  Referring Provider: Dayo James MD    Patient independently seen and examined, Chart, Consults, Notes, Operative notes, Labs, Cardiology, and Radiology studies reviewed as available. Subjective:  Kaycee Ford is a 59 y.o. female for whom we were consulted for evaluation and treatment of acute kidney injury with baseline chronic kidney disease stage IV. Patient unsure of specific baseline GFR but recalled stage IV. She is followed with the [facility] for CKD but does not believe she has seen them in over a year. Had a reported seizure and was admitted for further evaluation. Given Keppra in the emergency room. When initially seen she denied current complaints including chest pain, shortness of air at rest, nausea or vomiting. Denied dysuria, hematuria, hemoptysis, rash. Topical NSAIDs noted on home medicine list.  Today, she was sleepy but then denies any new complaints. Has good urine output.       Allergies:  Penicillins    Medicines:  Current Facility-Administered Medications   Medication Dose Route Frequency Provider Last Rate Last Admin    sodium chloride flush 0.9 % injection 5-40 mL  5-40 mL IntraVENous 2 times per day Dayo James MD   10 mL at 03/06/23 0930    sodium chloride flush 0.9 % injection 5-40 mL  5-40 mL IntraVENous PRN Dayo James MD        0.9 % sodium chloride infusion   IntraVENous PRN Dayo James MD        enoxaparin Sodium (LOVENOX) injection 30 mg  30 mg SubCUTAneous Daily Dayo James MD   30 mg at 03/06/23 0930    acetaminophen (TYLENOL) tablet 650 mg  650 mg Oral Q4H PRN Dayo James MD        ondansetron (ZOFRAN-ODT) disintegrating tablet 4 mg  4 mg Oral Q8H PRN Ramírez Dickinson MD        Or    ondansetron [facility]) injection 4 mg  4 mg IntraVENous Q6H PRN Ramírez Dickinson MD        levETIRAcetam (KEPPRA) tablet 500 mg  500 mg Oral BID Ramírez Dickinson MD   500 mg at 03/06/23 0930    LORazepam (ATIVAN) injection 1 mg  1 mg IntraVENous Q4H PRN Ramírez Dickinson MD        glucose chewable tablet 16 g  4 tablet Oral PRN Lena Michelle MD        dextrose bolus 10% 125 mL  125 mL IntraVENous PRN Lena Michelle MD        Or    dextrose bolus 10% 250 mL  250 mL IntraVENous PRN Lena Michelle MD   Stopped at 03/06/23 0550    glucagon (rDNA) injection 1 mg  1 mg SubCUTAneous PRN Lena Michelle MD        dextrose 10 % infusion   IntraVENous Continuous PRN Lena Michelle  mL/hr at 03/06/23 0557 New Bag at 03/06/23 0557    FLUoxetine (PROZAC) capsule 40 mg  40 mg Oral Daily Lena Michelle MD   40 mg at 03/06/23 0930    HYDROcodone-acetaminophen ([facility]) 7.5-325 MG per tablet 1 tablet  1 tablet Oral Q6H PRN Lena Michelle MD   1 tablet at 03/05/23 2109    pantoprazole (PROTONIX) tablet 40 mg  40 mg Oral QAM AC Lena Michelle MD   40 mg at 03/06/23 0530    rosuvastatin (CRESTOR) tablet 10 mg  10 mg Oral Nightly Lena Michelle MD   10 mg at 03/05/23 2109    insulin lispro (HUMALOG) injection vial 0-4 Units  0-4 Units SubCUTAneous TID  Lena Michelle MD        insulin lispro (HUMALOG) injection vial 0-4 Units  0-4 Units SubCUTAneous Nightly Lena Michelle MD        sodium bicarbonate tablet 650 mg  650 mg Oral BID Uzma Crump MD   650 mg at 03/06/23 0930    [Held by provider] insulin glargine (LANTUS) injection vial 15 Units  15 Units SubCUTAneous Nightly Lena Michelle MD           Past Medical History:  Past Medical History:   Diagnosis Date    Alcohol abuse     Cataract     L eye    DVT (deep venous thrombosis) ([facility]     LLE    Hypercholesterolemia     Pulmonary emboli (HCC)     Seizures ([facility] Thyroid cyst     Type II diabetes mellitus (HCC)        Past Surgical History:  Past Surgical History:   Procedure Laterality Date    CHOLECYSTECTOMY, LAPAROSCOPIC         Family History  Family History   Problem Relation Age of Onset    Cancer Mother 80        Lung    Cancer Father 79        renal        Social History  Social History     Socioeconomic History    Marital status:      Spouse name: Not on file    Number of children: Not on file    Years of education: Not on file    Highest education level: Not on file   Occupational History    Not on file   Tobacco Use    Smoking status: Former     Packs/day: 2.00     Years: 44.00     Pack years: 88.00     Types: Cigarettes     Start date: 0     Quit date:      Years since quittin.1    Smokeless tobacco: Never   Vaping Use    Vaping Use: Never used   Substance and Sexual Activity    Alcohol use: Yes     Comment: 2-3 glasses of Manjit/Coke per day    Drug use: Yes     Types: Marijuana (Weed)     Comment: occassional    Sexual activity: Never   Other Topics Concern    Not on file   Social History Narrative    Not on file     Social Determinants of Health     Financial Resource Strain: Not on file   Food Insecurity: Not on file   Transportation Needs: Not on file   Physical Activity: Not on file   Stress: Not on file   Social Connections: Not on file   Intimate Partner Violence: Not on file   Housing Stability: Not on file         Review of Systems:  History obtained from chart review and the patient  General ROS: No fever or chills  Respiratory ROS: No cough, shortness of breath, wheezing  Cardiovascular ROS: No chest pain or palpitations  Gastrointestinal ROS: No abdominal pain or melena  Genito-Urinary ROS: No dysuria or hematuria  Musculoskeletal ROS: No joint pain or swelling   14 point ROS reviewed with the patient and negative except as noted above and in the HPI unless unable to obtain.     Objective:  Patient Vitals for the past 24 hrs:   BP Temp Temp src Pulse Resp SpO2   03/06/23 0817 -- -- -- -- -- 98 %   03/06/23 0735 116/62 (!) 96.6 °F (35.9 °C) Temporal 58 18 97 %   03/06/23 0424 138/82 97.9 °F (36.6 °C) -- 58 18 97 %   03/05/23 1957 (!) 141/73 98.1 °F (36.7 °C) Temporal 67 16 97 %   03/05/23 1630 136/83 -- Temporal 63 18 97 %         Intake/Output Summary (Last 24 hours) at 3/6/2023 1117  Last data filed at 3/6/2023 0541  Gross per 24 hour   Intake 281.44 ml   Output --   Net 281.44 ml     General: awake/alert   Chest:  clear to auscultation bilaterally  CVS: regular rate and rhythm  Abdominal: soft, nontender, normal bowel sounds  Extremities: no cyanosis or edema  Skin: warm and dry without rash      Labs:  BMP:   Recent Labs     03/04/23 2049 03/06/23  0148    144   K 4.4 4.6    110   CO2 18* 22   PHOS  --  4.5   BUN 45* 37*   CREATININE 2.9* 2.5*   CALCIUM 8.3* 8.6       CBC:   Recent Labs     03/04/23 2049   WBC 5.6   HGB 12.2   HCT 37.7   MCV 99.7*   *       LIVER PROFILE:   Recent Labs     03/04/23 2049   AST 31   ALT 19   BILITOT 0.3   ALKPHOS 195*       PT/INR: No results for input(s): PROTIME, INR in the last 72 hours. APTT: No results for input(s): APTT in the last 72 hours. BNP:  No results for input(s): BNP in the last 72 hours. Ionized Calcium:No results for input(s): IONCA in the last 72 hours. Magnesium:  Recent Labs     03/06/23  0148   MG 1.9     Phosphorus:  Recent Labs     03/06/23  0148   PHOS 4.5     HgbA1C: No results for input(s): LABA1C in the last 72 hours. Hepatic:   Recent Labs     03/04/23 2049   ALKPHOS 195*   ALT 19   AST 31   PROT 7.0   BILITOT 0.3   LABALBU 3.6       Lactic Acid: No results for input(s): LACTA in the last 72 hours. Troponin: No results for input(s): CKTOTAL, CKMB, TROPONINT in the last 72 hours. ABGs: No results for input(s): PH, PCO2, PO2, HCO3, O2SAT in the last 72 hours. CRP:  No results for input(s): CRP in the last 72 hours.   Sed Rate:  No results for input(s): SEDRATE in the last 72 hours. Cultures:   No results for input(s): CULTURE in the last 72 hours. No results for input(s): BC, Ammon Kappa in the last 72 hours. No results for input(s): CXSURG in the last 72 hours. Radiology reports as per the Radiologist  Radiology: CT Head W/O Contrast    Result Date: 3/4/2023  Patient: Edwin Keenan  Time Out: 23:25Exam(s): CT HEAD Without Contrast EXAM:  CT Head Without Intravenous ContrastCLINICAL HISTORY:   Reason for exam: Kimberly Delgadillo, ? Seizure. TECHNIQUE:  Axial computed tomography images of the head/brain without intravenous contrast.COMPARISON:  No relevant prior studies available. FINDINGS:  Brain:  Unremarkable. No hemorrhage. No significant white matter disease. No edema. Ventricles:  Unremarkable. No ventriculomegaly. Bones/joints:  Unremarkable. No acute fracture. Soft tissues:  Unremarkable. Sinuses:  Unremarkable as visualized. No acute sinusitis. Mastoid air cells:  Unremarkable as visualized. No mastoid effusion. Normal head/brain CT. Electronically signed by Ace Morgan MD on 03-04-23 at 2325    XR CHEST PORTABLE    Result Date: 3/4/2023  Patient: Edwin Keenan  Time Out: 23:24Exam(s): FILM CXR 1 VIEW EXAM:  XR Chest, 1 ViewCLINICAL HISTORY:   Reason for exam: syncope. TECHNIQUE:  Frontal view of the chest.COMPARISON:  No relevant prior studies available. FINDINGS:  Lungs:  Unremarkable. No consolidation. Pleural space:  Unremarkable. No pneumothorax. Heart: Cardiomegaly. Mediastinum:  Unremarkable. Bones/joints:  Unremarkable. Normal chest x-ray. Electronically signed by Ace Morgan MD on 03-04-23 at 2324       Assessment   -Acute kidney injury- pre renal azotemia  -Chronic kidney disease stage IV  -Type 2 DM with renal disease-Hypoglycemia  -Seizure  -Hypertension  -Metabolic acidosis  -Low vit D  -Sec HPTH      Plan:  Discussed with patient, nursing  Bicarbonate supplementation  Neurology evaluation pending  Monitor labs  Follow-up with primary nephrology group 1 week postdischarge      Maria Elena Lowe MD  03/06/23  11:17 AM

## 2023-03-06 NOTE — DISCHARGE INSTRUCTIONS
Keep follow up appointments  Take medications as directed  Monitor blood sugar as directed  Drink plenty of fluids  Seizure Precautions:  -  no driving until 6 months free of a seizure and until released by your Dr to drive  -  take medications as prescribed.    - showers, not baths, avoid swimming alone, avoid operating heavy machinery, avoid heights, avoid proximity to open fires/flames, high impact sports, and bodies of water (lakes, rivers, swimming pools) unless accompanied by another.   - Avoid situation that can enhance seizure recurrence such as lack of sleep, undue stress and fatigue, excessive alcohol consumption and use of recreational drugs    Follow-up with primary nephrology group 1 week postdischarge

## 2023-03-09 NOTE — TELEPHONE ENCOUNTER
Tried calling patient to let her know when I her scheduled an HFU with Dr. Abhilash Aguilar. NO answer. NO VM. Will mail patient her appointment time/date information.

## 2023-04-03 ENCOUNTER — TELEPHONE (OUTPATIENT)
Dept: NEUROLOGY | Age: 58
End: 2023-04-03

## 2023-04-04 ENCOUNTER — OFFICE VISIT (OUTPATIENT)
Dept: NEUROLOGY | Age: 58
End: 2023-04-04
Payer: COMMERCIAL

## 2023-04-04 VITALS
DIASTOLIC BLOOD PRESSURE: 81 MMHG | BODY MASS INDEX: 35.44 KG/M2 | RESPIRATION RATE: 18 BRPM | HEART RATE: 78 BPM | HEIGHT: 63 IN | WEIGHT: 200 LBS | SYSTOLIC BLOOD PRESSURE: 135 MMHG

## 2023-04-04 DIAGNOSIS — G40.919 INTRACTABLE SEIZURE DISORDER (HCC): Primary | ICD-10-CM

## 2023-04-04 PROCEDURE — 3078F DIAST BP <80 MM HG: CPT | Performed by: PSYCHIATRY & NEUROLOGY

## 2023-04-04 PROCEDURE — 3074F SYST BP LT 130 MM HG: CPT | Performed by: PSYCHIATRY & NEUROLOGY

## 2023-04-04 PROCEDURE — 99213 OFFICE O/P EST LOW 20 MIN: CPT | Performed by: PSYCHIATRY & NEUROLOGY

## 2023-07-29 ENCOUNTER — TRANSCRIBE ORDERS (OUTPATIENT)
Dept: ADMINISTRATIVE | Facility: HOSPITAL | Age: 58
End: 2023-07-29
Payer: COMMERCIAL

## 2023-07-29 DIAGNOSIS — Z79.891 ENCOUNTER FOR LONG-TERM USE OF OPIATE ANALGESIC: ICD-10-CM

## 2023-07-29 DIAGNOSIS — M47.816 SPONDYLOSIS WITHOUT MYELOPATHY OR RADICULOPATHY, LUMBAR REGION: ICD-10-CM

## 2023-07-29 DIAGNOSIS — G89.12 ACUTE POST-THORACOTOMY PAIN: ICD-10-CM

## 2023-07-29 DIAGNOSIS — M54.12 CERVICAL RADICULITIS: Primary | ICD-10-CM

## 2023-07-29 DIAGNOSIS — M47.812 SPONDYLOSIS WITHOUT MYELOPATHY OR RADICULOPATHY, CERVICAL REGION: ICD-10-CM

## 2023-07-29 DIAGNOSIS — M79.10 MYALGIA: ICD-10-CM
